# Patient Record
Sex: MALE | Race: WHITE | NOT HISPANIC OR LATINO | Employment: OTHER | URBAN - METROPOLITAN AREA
[De-identification: names, ages, dates, MRNs, and addresses within clinical notes are randomized per-mention and may not be internally consistent; named-entity substitution may affect disease eponyms.]

---

## 2020-02-24 ENCOUNTER — HISTORICAL (OUTPATIENT)
Dept: ADMINISTRATIVE | Facility: HOSPITAL | Age: 65
End: 2020-02-24

## 2020-02-24 LAB
ALBUMIN SERPL-MCNC: 4.6 G/DL (ref 3.8–4.8)
ALBUMIN/GLOB SERPL: 2.4 {RATIO} (ref 1.2–2.2)
ALP SERPL-CCNC: 95 IU/L (ref 39–117)
ALT SERPL-CCNC: 22 IU/L (ref 0–44)
AST SERPL-CCNC: 31 IU/L (ref 0–40)
BASOPHILS # BLD AUTO: 0.1 X10E3/UL (ref 0–0.2)
BASOPHILS NFR BLD AUTO: 1 %
BILIRUB SERPL-MCNC: 0.5 MG/DL (ref 0–1.2)
BUN SERPL-MCNC: 20 MG/DL (ref 8–27)
CALCIUM SERPL-MCNC: 10 MG/DL (ref 8.6–10.2)
CHLORIDE SERPL-SCNC: 100 MMOL/L (ref 96–106)
CHOLEST SERPL-MCNC: 123 MG/DL (ref 100–199)
CHOLEST/HDLC SERPL: 3.3 RATIO (ref 0–5)
CO2 SERPL-SCNC: 25 MMOL/L (ref 20–29)
CREAT SERPL-MCNC: 1.07 MG/DL (ref 0.76–1.27)
CREAT/UREA NIT SERPL: 19 (ref 10–24)
EOSINOPHIL # BLD AUTO: 0.1 X10E3/UL (ref 0–0.4)
EOSINOPHIL NFR BLD AUTO: 2 %
ERYTHROCYTE [DISTWIDTH] IN BLOOD BY AUTOMATED COUNT: 11.9 % (ref 11.6–15.4)
GLOBULIN SER-MCNC: 1.9 G/DL (ref 1.5–4.5)
GLUCOSE SERPL-MCNC: 103 MG/DL (ref 65–99)
HBA1C MFR BLD: 5.7 % (ref 4.8–5.6)
HCT VFR BLD AUTO: 43.2 % (ref 37.5–51)
HDLC SERPL-MCNC: 37 MG/DL
HGB BLD-MCNC: 15 G/DL (ref 13–17.7)
LDLC SERPL CALC-MCNC: 66 MG/DL (ref 0–99)
LYMPHOCYTES # BLD AUTO: 1.7 X10E3/UL (ref 0.7–3.1)
LYMPHOCYTES NFR BLD AUTO: 25 %
MCH RBC QN AUTO: 30.1 PG (ref 26.6–33)
MCHC RBC AUTO-ENTMCNC: 34.7 G/DL (ref 31.5–35.7)
MCV RBC AUTO: 87 FL (ref 79–97)
MONOCYTES # BLD AUTO: 0.5 X10E3/UL (ref 0.1–0.9)
MONOCYTES NFR BLD AUTO: 8 %
NEUTROPHILS # BLD AUTO: 4.2 X10E3/UL (ref 1.4–7)
NEUTROPHILS NFR BLD AUTO: 64 %
PLATELET # BLD AUTO: 238 X10E3/UL (ref 150–450)
POTASSIUM SERPL-SCNC: 4.2 MMOL/L (ref 3.5–5.2)
PROT SERPL-MCNC: 6.5 G/DL (ref 6–8.5)
PSA SERPL-MCNC: 0.6 NG/ML (ref 0–4)
RBC # BLD AUTO: 4.98 X10(6)/MCL (ref 4.14–5.8)
SODIUM SERPL-SCNC: 140 MMOL/L (ref 134–144)
TRIGL SERPL-MCNC: 98 MG/DL (ref 0–149)
TSH SERPL-ACNC: 2.8 MIU/ML (ref 0.45–4.5)
VLDLC SERPL CALC-MCNC: 20 MG/DL (ref 5–40)
WBC # SPEC AUTO: 6.6 X10E3/UL (ref 3.4–10.8)

## 2020-08-25 ENCOUNTER — HISTORICAL (OUTPATIENT)
Dept: ADMINISTRATIVE | Facility: HOSPITAL | Age: 65
End: 2020-08-25

## 2020-08-25 LAB
ALBUMIN SERPL-MCNC: 4.6 G/DL (ref 3.8–4.8)
ALBUMIN/GLOB SERPL: 2.4 {RATIO} (ref 1.2–2.2)
ALP SERPL-CCNC: 91 IU/L (ref 39–117)
ALT SERPL-CCNC: 22 IU/L (ref 0–44)
AST SERPL-CCNC: 31 IU/L (ref 0–40)
BASOPHILS # BLD AUTO: 0.1 X10E3/UL (ref 0–0.2)
BASOPHILS NFR BLD AUTO: 1 %
BILIRUB SERPL-MCNC: 0.5 MG/DL (ref 0–1.2)
BUN SERPL-MCNC: 20 MG/DL (ref 8–27)
CALCIUM SERPL-MCNC: 9.9 MG/DL (ref 8.6–10.2)
CHLORIDE SERPL-SCNC: 101 MMOL/L (ref 96–106)
CHOLEST SERPL-MCNC: 135 MG/DL (ref 100–199)
CHOLEST/HDLC SERPL: 3.8 RATIO (ref 0–5)
CO2 SERPL-SCNC: 26 MMOL/L (ref 20–29)
CREAT SERPL-MCNC: 1.19 MG/DL (ref 0.76–1.27)
CREAT/UREA NIT SERPL: 17 (ref 10–24)
EOSINOPHIL # BLD AUTO: 0.2 X10E3/UL (ref 0–0.4)
EOSINOPHIL NFR BLD AUTO: 3 %
ERYTHROCYTE [DISTWIDTH] IN BLOOD BY AUTOMATED COUNT: 12 % (ref 11.6–15.4)
GLOBULIN SER-MCNC: 1.9 G/DL (ref 1.5–4.5)
GLUCOSE SERPL-MCNC: 111 MG/DL (ref 65–99)
HBA1C MFR BLD: 5.8 % (ref 4.8–5.6)
HCT VFR BLD AUTO: 43.1 % (ref 37.5–51)
HDLC SERPL-MCNC: 36 MG/DL
HGB BLD-MCNC: 14.9 G/DL (ref 13–17.7)
LDLC SERPL CALC-MCNC: 77 MG/DL (ref 0–99)
LYMPHOCYTES # BLD AUTO: 1.9 X10E3/UL (ref 0.7–3.1)
LYMPHOCYTES NFR BLD AUTO: 24 %
MCH RBC QN AUTO: 30.4 PG (ref 26.6–33)
MCHC RBC AUTO-ENTMCNC: 34.6 G/DL (ref 31.5–35.7)
MCV RBC AUTO: 88 FL (ref 79–97)
MONOCYTES # BLD AUTO: 0.6 X10E3/UL (ref 0.1–0.9)
MONOCYTES NFR BLD AUTO: 8 %
NEUTROPHILS # BLD AUTO: 4.8 X10E3/UL (ref 1.4–7)
NEUTROPHILS NFR BLD AUTO: 64 %
PLATELET # BLD AUTO: 224 X10E3/UL (ref 150–450)
POTASSIUM SERPL-SCNC: 4.3 MMOL/L (ref 3.5–5.2)
PROT SERPL-MCNC: 6.5 G/DL (ref 6–8.5)
PSA SERPL-MCNC: 0.6 NG/ML (ref 0–4)
RBC # BLD AUTO: 4.9 X10(6)/MCL (ref 4.14–5.8)
SODIUM SERPL-SCNC: 141 MMOL/L (ref 134–144)
TRIGL SERPL-MCNC: 110 MG/DL (ref 0–149)
TSH SERPL-ACNC: 2.95 MIU/ML (ref 0.45–4.5)
VLDLC SERPL CALC-MCNC: 22 MG/DL (ref 5–40)
WBC # SPEC AUTO: 7.6 X10E3/UL (ref 3.4–10.8)

## 2021-02-23 ENCOUNTER — HISTORICAL (OUTPATIENT)
Dept: ADMINISTRATIVE | Facility: HOSPITAL | Age: 66
End: 2021-02-23

## 2021-02-23 LAB
ALBUMIN SERPL-MCNC: 4.8 G/DL (ref 3.8–4.8)
ALBUMIN/GLOB SERPL: 2.3 {RATIO} (ref 1.2–2.2)
ALP SERPL-CCNC: 106 IU/L (ref 39–117)
ALT SERPL-CCNC: 20 IU/L (ref 0–44)
AST SERPL-CCNC: 27 IU/L (ref 0–40)
BILIRUB SERPL-MCNC: 0.6 MG/DL (ref 0–1.2)
BUN SERPL-MCNC: 18 MG/DL (ref 8–27)
CALCIUM SERPL-MCNC: 9.8 MG/DL (ref 8.6–10.2)
CHLORIDE SERPL-SCNC: 98 MMOL/L (ref 96–106)
CHOLEST SERPL-MCNC: 128 MG/DL (ref 100–199)
CHOLEST/HDLC SERPL: 3.2 RATIO (ref 0–5)
CO2 SERPL-SCNC: 24 MMOL/L (ref 20–29)
CREAT SERPL-MCNC: 1.12 MG/DL (ref 0.76–1.27)
CREAT/UREA NIT SERPL: 16 (ref 10–24)
DEPRECATED CALCIDIOL+CALCIFEROL SERPL-MC: 23.2 NG/ML (ref 30–100)
GLOBULIN SER-MCNC: 2.1 G/DL (ref 1.5–4.5)
GLUCOSE SERPL-MCNC: 110 MG/DL (ref 65–99)
HBA1C MFR BLD: 5.8 % (ref 4.8–5.6)
HDLC SERPL-MCNC: 40 MG/DL
LDLC SERPL CALC-MCNC: 71 MG/DL (ref 0–99)
POTASSIUM SERPL-SCNC: 4.3 MMOL/L (ref 3.5–5.2)
PROT SERPL-MCNC: 6.9 G/DL (ref 6–8.5)
SODIUM SERPL-SCNC: 138 MMOL/L (ref 134–144)
TRIGL SERPL-MCNC: 88 MG/DL (ref 0–149)
VLDLC SERPL CALC-MCNC: 17 MG/DL (ref 5–40)

## 2021-08-30 ENCOUNTER — HISTORICAL (OUTPATIENT)
Dept: ADMINISTRATIVE | Facility: HOSPITAL | Age: 66
End: 2021-08-30

## 2021-08-30 LAB
ALBUMIN SERPL-MCNC: 4.6 G/DL (ref 3.8–4.8)
ALBUMIN/GLOB SERPL: 2.1 {RATIO} (ref 1.2–2.2)
ALP SERPL-CCNC: 106 IU/L (ref 48–121)
ALT SERPL-CCNC: 18 IU/L (ref 0–44)
AST SERPL-CCNC: 25 IU/L (ref 0–40)
BASOPHILS # BLD AUTO: 0.1 X10E3/UL (ref 0–0.2)
BASOPHILS NFR BLD AUTO: 1 %
BILIRUB SERPL-MCNC: 0.4 MG/DL (ref 0–1.2)
BUN SERPL-MCNC: 24 MG/DL (ref 8–27)
CALCIUM SERPL-MCNC: 9.7 MG/DL (ref 8.6–10.2)
CHLORIDE SERPL-SCNC: 103 MMOL/L (ref 96–106)
CHOLEST SERPL-MCNC: 133 MG/DL (ref 100–199)
CHOLEST/HDLC SERPL: 4.2 RATIO (ref 0–5)
CO2 SERPL-SCNC: 26 MMOL/L (ref 20–29)
CREAT SERPL-MCNC: 0.99 MG/DL (ref 0.76–1.27)
CREAT/UREA NIT SERPL: 24 (ref 10–24)
EOSINOPHIL # BLD AUTO: 0.2 X10E3/UL (ref 0–0.4)
EOSINOPHIL NFR BLD AUTO: 2 %
ERYTHROCYTE [DISTWIDTH] IN BLOOD BY AUTOMATED COUNT: 12.8 % (ref 11.6–15.4)
GLOBULIN SER-MCNC: 2.2 G/DL (ref 1.5–4.5)
GLUCOSE SERPL-MCNC: 108 MG/DL (ref 65–99)
HBA1C MFR BLD: 5.8 % (ref 4.8–5.6)
HCT VFR BLD AUTO: 43.9 % (ref 37.5–51)
HDLC SERPL-MCNC: 32 MG/DL
HGB BLD-MCNC: 14.8 G/DL (ref 13–17.7)
LDLC SERPL CALC-MCNC: 80 MG/DL (ref 0–99)
LYMPHOCYTES # BLD AUTO: 2 X10E3/UL (ref 0.7–3.1)
LYMPHOCYTES NFR BLD AUTO: 23 %
MCH RBC QN AUTO: 29.2 PG (ref 26.6–33)
MCHC RBC AUTO-ENTMCNC: 33.7 G/DL (ref 31.5–35.7)
MCV RBC AUTO: 87 FL (ref 79–97)
MICROALBUMIN/CREAT RATIO PNL UR: <6 MG/G CREAT (ref 0–29)
MONOCYTES # BLD AUTO: 0.6 X10E3/UL (ref 0.1–0.9)
MONOCYTES NFR BLD AUTO: 7 %
NEUTROPHILS # BLD AUTO: 5.7 X10E3/UL (ref 1.4–7)
NEUTROPHILS NFR BLD AUTO: 67 %
PLATELET # BLD AUTO: 235 X10E3/UL (ref 150–450)
POTASSIUM SERPL-SCNC: 4.2 MMOL/L (ref 3.5–5.2)
PROT SERPL-MCNC: 6.8 G/DL (ref 6–8.5)
RBC # BLD AUTO: 5.07 X10(6)/MCL (ref 4.14–5.8)
SODIUM SERPL-SCNC: 141 MMOL/L (ref 134–144)
TRIGL SERPL-MCNC: 113 MG/DL (ref 0–149)
TSH SERPL-ACNC: 4.3 MIU/ML (ref 0.45–4.5)
URATE SERPL-MCNC: 5.6 MG/DL (ref 3.8–8.4)
VLDLC SERPL CALC-MCNC: 21 MG/DL (ref 5–40)
WBC # SPEC AUTO: 8.6 X10E3/UL (ref 3.4–10.8)

## 2022-03-07 LAB
AGE: 65
ALBUMIN SERPL-MCNC: 4.5 G/DL (ref 3.4–5)
ALBUMIN/GLOB SERPL: 2 {RATIO}
ALP SERPL-CCNC: 93 U/L (ref 50–144)
ALT SERPL-CCNC: 24 U/L (ref 1–45)
ANION GAP SERPL CALC-SCNC: 8 MMOL/L (ref 2–13)
AST SERPL-CCNC: 44 U/L (ref 17–59)
BASOPHILS # BLD AUTO: 0.07 10*3/UL (ref 0.01–0.08)
BASOPHILS NFR BLD AUTO: 0.9 % (ref 0.1–1.2)
BILIRUB SERPL-MCNC: 0.49 MG/DL (ref 0–1)
BUN SERPL-MCNC: 27 MG/DL (ref 7–20)
CALCIUM SERPL-MCNC: 10 MG/DL (ref 8.4–10.2)
CHLORIDE SERPL-SCNC: 103 MMOL/L (ref 94–110)
CHOLEST SERPL-MCNC: 138 MG/DL (ref 0–200)
CO2 SERPL-SCNC: 28 MMOL/L (ref 21–32)
CREAT SERPL-MCNC: 1.02 MG/DL (ref 0.66–1.25)
CREAT/UREA NIT SERPL: 26.5 (ref 12–20)
DEPRECATED CALCIDIOL+CALCIFEROL SERPL-MC: 55.1 NG/ML (ref 30–100)
EOSINOPHIL # BLD AUTO: 0.12 10*3/UL (ref 0.04–0.54)
EOSINOPHIL NFR BLD AUTO: 1.5 % (ref 0.7–7)
ERYTHROCYTE [DISTWIDTH] IN BLOOD BY AUTOMATED COUNT: 12.4 % (ref 11.6–14.4)
GLOBULIN SER-MCNC: 2.2 G/DL (ref 2–3.9)
GLUCOSE SERPL-MCNC: 100 MG/DL (ref 70–115)
HCT VFR BLD AUTO: 42.5 % (ref 36–52)
HDLC SERPL-MCNC: 34 MG/DL (ref 40–60)
HGB BLD-MCNC: 14.6 G/DL (ref 13–18)
IMM GRANULOCYTES # BLD AUTO: 0.03 10*3/UL (ref 0–0.03)
IMM GRANULOCYTES NFR BLD AUTO: 0.4 % (ref 0–0.5)
LDLC SERPL CALC-MCNC: 78.9 MG/DL (ref 30–100)
LYMPHOCYTES # BLD AUTO: 2.08 10*3/UL (ref 1.32–3.57)
LYMPHOCYTES NFR BLD AUTO: 26.6 % (ref 20–55)
MANUAL DIFF? (OHS): NORMAL
MCH RBC QN AUTO: 29.4 PG (ref 27–34)
MCHC RBC AUTO-ENTMCNC: 34.4 G/DL (ref 31–37)
MCV RBC AUTO: 85.5 FL (ref 79–99)
MONOCYTES # BLD AUTO: 0.63 10*3/UL (ref 0.3–0.82)
MONOCYTES NFR BLD AUTO: 8.1 % (ref 4.7–12.5)
NEUTROPHILS # BLD AUTO: 4.88 10*3/UL (ref 1.78–5.38)
NEUTROPHILS NFR BLD AUTO: 62.5 % (ref 37–73)
PLATELET # BLD AUTO: 225 10*3/UL (ref 140–371)
PMV BLD AUTO: 11.4 FL (ref 9.4–12.4)
POTASSIUM SERPL-SCNC: 4.2 MMOL/L (ref 3.5–5.1)
PROT SERPL-MCNC: 6.7 G/DL (ref 6.3–8.2)
PSA SERPL-MCNC: 0.53 NG/ML (ref 0–4)
RBC # BLD AUTO: 4.97 10*6/UL (ref 4–6)
SODIUM SERPL-SCNC: 139 MMOL/L (ref 135–145)
TRIGL SERPL-MCNC: 118 MG/DL (ref 30–200)
TSH SERPL-ACNC: 3.26 M[IU]/L (ref 0.36–3.74)
WBC # SPEC AUTO: 7.8 10*3/UL (ref 4–11.5)

## 2022-03-28 DIAGNOSIS — Z12.11 SCREENING FOR COLON CANCER: Primary | ICD-10-CM

## 2022-04-11 ENCOUNTER — HISTORICAL (OUTPATIENT)
Dept: ADMINISTRATIVE | Facility: HOSPITAL | Age: 67
End: 2022-04-11
Payer: MEDICARE

## 2022-04-28 VITALS
HEIGHT: 66 IN | BODY MASS INDEX: 34.07 KG/M2 | SYSTOLIC BLOOD PRESSURE: 124 MMHG | WEIGHT: 212 LBS | OXYGEN SATURATION: 97 % | DIASTOLIC BLOOD PRESSURE: 74 MMHG

## 2022-05-15 ENCOUNTER — HISTORICAL (OUTPATIENT)
Dept: ADMINISTRATIVE | Facility: HOSPITAL | Age: 67
End: 2022-05-15
Payer: MEDICARE

## 2022-05-26 ENCOUNTER — OFFICE VISIT (OUTPATIENT)
Dept: GASTROENTEROLOGY | Facility: CLINIC | Age: 67
End: 2022-05-26
Payer: MEDICARE

## 2022-05-26 VITALS
BODY MASS INDEX: 34.55 KG/M2 | DIASTOLIC BLOOD PRESSURE: 80 MMHG | WEIGHT: 215 LBS | HEART RATE: 61 BPM | HEIGHT: 66 IN | SYSTOLIC BLOOD PRESSURE: 151 MMHG | OXYGEN SATURATION: 97 %

## 2022-05-26 DIAGNOSIS — Z12.11 SCREENING FOR COLON CANCER: ICD-10-CM

## 2022-05-26 DIAGNOSIS — Z86.010 HISTORY OF COLON POLYPS: ICD-10-CM

## 2022-05-26 DIAGNOSIS — Z80.0 FAMILY HISTORY OF COLON CANCER: ICD-10-CM

## 2022-05-26 DIAGNOSIS — K59.00 CONSTIPATION, UNSPECIFIED CONSTIPATION TYPE: Primary | ICD-10-CM

## 2022-05-26 PROCEDURE — 99203 PR OFFICE/OUTPT VISIT, NEW, LEVL III, 30-44 MIN: ICD-10-PCS | Mod: S$GLB,,,

## 2022-05-26 PROCEDURE — 99203 OFFICE O/P NEW LOW 30 MIN: CPT | Mod: S$GLB,,,

## 2022-05-26 RX ORDER — PRAVASTATIN SODIUM 40 MG/1
TABLET ORAL
COMMUNITY
Start: 2022-01-20 | End: 2023-04-27

## 2022-05-26 RX ORDER — OLMESARTAN MEDOXOMIL AND HYDROCHLOROTHIAZIDE 40/25 40; 25 MG/1; MG/1
TABLET ORAL
COMMUNITY
Start: 2022-01-20 | End: 2023-07-27

## 2022-05-26 RX ORDER — SOD SULF/POT CHLORIDE/MAG SULF 1.479 G
12 TABLET ORAL DAILY
Qty: 24 TABLET | Refills: 0 | Status: CANCELLED | OUTPATIENT
Start: 2022-05-26

## 2022-05-26 RX ORDER — SODIUM, POTASSIUM,MAG SULFATES 17.5-3.13G
1 SOLUTION, RECONSTITUTED, ORAL ORAL ONCE
Qty: 1 KIT | Refills: 0 | Status: SHIPPED | OUTPATIENT
Start: 2022-05-26 | End: 2022-05-26

## 2022-05-26 NOTE — PROGRESS NOTES
Clinic Note    Reason for visit:  The primary encounter diagnosis was Constipation, unspecified constipation type. Diagnoses of History of colon polyps, Family history of colon cancer, Screening for colon cancer, and BMI 34.0-34.9,adult were also pertinent to this visit.    PCP: Sil Gracia / [place] [address]    HPI:  This is a 66 y.o. male who is here to establish care. Patient's last colonoscopy was with Dr. Laurent at least 10 years ago and reports having polyps taken out in the past. Patient denies any diarrhea, blood in stool, reflux, or abdominal pain. Mother had colon cancer. Patient reports occasional constipation, but controls this with diet. Patient states he doesn't go longer than 2 days without a BM.         Review of Systems   Constitutional: Negative for chills, diaphoresis, fatigue, fever and unexpected weight change.   HENT: Negative for hearing loss, mouth sores, nosebleeds, postnasal drip, sore throat, trouble swallowing and voice change.    Eyes: Negative for pain, discharge and eye dryness.   Respiratory: Negative for apnea, cough, choking, chest tightness, shortness of breath and wheezing.    Cardiovascular: Negative for chest pain, palpitations, leg swelling and claudication.   Gastrointestinal: Negative for abdominal distention, abdominal pain, anal bleeding, blood in stool, change in bowel habit, constipation, diarrhea, nausea, rectal pain, vomiting, reflux, fecal incontinence and change in bowel habit.   Genitourinary: Negative for bladder incontinence, difficulty urinating, dysuria, flank pain, frequency and hematuria.   Musculoskeletal: Negative for arthralgias, back pain, joint swelling and joint deformity.   Integumentary:  Negative for color change, rash and wound.   Allergic/Immunologic: Negative for environmental allergies and food allergies.   Neurological: Negative for seizures, facial asymmetry, speech difficulty, weakness, headaches and memory loss.  "  Hematological: Negative for adenopathy. Does not bruise/bleed easily.   Psychiatric/Behavioral: Negative for agitation, behavioral problems, confusion, hallucinations and sleep disturbance.      Past Medical History:   Diagnosis Date    High blood pressure     High cholesterol      History reviewed. No pertinent surgical history.  Family History   Problem Relation Age of Onset    Colon cancer Mother      Social History     Tobacco Use    Smoking status: Never Smoker    Smokeless tobacco: Never Used   Substance Use Topics    Alcohol use: Never    Drug use: Never     Review of patient's allergies indicates:  No Known Allergies     Medication List with Changes/Refills   New Medications    SODIUM,POTASSIUM,MAG SULFATES (SUPREP BOWEL PREP KIT) 17.5-3.13-1.6 GRAM SOLR    Take 177 mLs by mouth once. Take according to kit instructions but DO NOT eat breakfast the morning of procedure. for 1 dose   Current Medications    OLMESARTAN-HYDROCHLOROTHIAZIDE (BENICAR HCT) 40-25 MG PER TABLET    Take by mouth.    PRAVASTATIN (PRAVACHOL) 40 MG TABLET      See Instructions, TAKE ONE TABLET BY MOUTH DAILY, # 90 tab(s), 1 Refill(s), Pharmacy: [facility], 167.64, cm, Height/Length Dosing, 08/30/21 7:07:00 CDT, 96.16, kg, Weight Dosing, 08/30/21 7:07:00 CDT       Vital Signs:  BP (!) 151/80 (BP Location: Left arm, Patient Position: Sitting, BP Method: Medium (Automatic))   Pulse 61   Ht 5' 6" (1.676 m)   Wt 97.5 kg (215 lb)   SpO2 97%   BMI 34.70 kg/m²   Body mass index is 34.7 kg/m².      Physical Exam  Constitutional:       General: He is not in acute distress.     Appearance: Normal appearance. He is well-developed. He is obese. He is not ill-appearing or toxic-appearing.   HENT:      Head: Normocephalic and atraumatic.      Nose: Nose normal.      Mouth/Throat:      Mouth: Mucous membranes are moist.      Pharynx: Oropharynx is clear. No oropharyngeal exudate or posterior oropharyngeal erythema. "   Eyes:      General: Lids are normal. No scleral icterus.        Right eye: No discharge.         Left eye: No discharge.      Extraocular Movements: Extraocular movements intact.      Conjunctiva/sclera: Conjunctivae normal.   Cardiovascular:      Rate and Rhythm: Normal rate and regular rhythm.      Pulses:           Radial pulses are 2+ on the right side and 2+ on the left side.   Pulmonary:      Effort: Pulmonary effort is normal. No respiratory distress.      Breath sounds: No stridor. No wheezing or rhonchi.   Abdominal:      General: Bowel sounds are normal. There is no distension.      Palpations: Abdomen is soft. There is no fluid wave, hepatomegaly, splenomegaly or mass.      Tenderness: There is no abdominal tenderness. There is no guarding or rebound.   Musculoskeletal:      Cervical back: Full passive range of motion without pain.      Right lower leg: No edema.      Left lower leg: No edema.   Lymphadenopathy:      Cervical: No cervical adenopathy.   Skin:     General: Skin is warm and dry.      Capillary Refill: Capillary refill takes less than 2 seconds.      Coloration: Skin is not cyanotic, jaundiced or pale.      Findings: No rash.   Neurological:      General: No focal deficit present.      Mental Status: He is alert and oriented to person, place, and time.   Psychiatric:         Mood and Affect: Mood normal.         Behavior: Behavior is cooperative.            All of the data above and below has been reviewed by myself and any further interpretations will be reflected in the assessment and plan.   The data includes review of external notes, and independent interpretation of lab results, procedures, x-rays, and imaging reports.      Assessment:  Constipation, unspecified constipation type    History of colon polyps  -     Ambulatory Referral to External Surgery    Family history of colon cancer  -     Ambulatory Referral to External Surgery    Screening for colon cancer  -     Ambulatory  Referral to External Surgery  -     Ambulatory referral/consult to Gastroenterology    BMI 34.0-34.9,adult    Other orders  -     sodium,potassium,mag sulfates (SUPREP BOWEL PREP KIT) 17.5-3.13-1.6 gram SolR; Take 177 mLs by mouth once. Take according to kit instructions but DO NOT eat breakfast the morning of procedure. for 1 dose  Dispense: 1 kit; Refill: 0      Constipation controlled with diet.    Recommendations:  Schedule colonoscopy with Dr. Guevara.     Patient screened for and received weight management and nutritional counseling regarding BMI of less than 18 or greater than 25.  Risks, benefits, and alternatives of medical management, any associated procedures, and/or treatment discussed with the patient. Patient given opportunity to ask questions and voices understanding. Patient has elected to proceed with the recommended care modalities as discussed.    Follow up if symptoms worsen or fail to improve.    Order summary:  Orders Placed This Encounter   Procedures    Ambulatory Referral to External Surgery           Nadeen Scales NP    This document may have been created using a voice recognition transcribing system. Incorrect words or phrases may have been missed during proofreading. Please interpret accordingly or contact me for clarification.

## 2022-07-13 ENCOUNTER — TELEPHONE (OUTPATIENT)
Dept: GASTROENTEROLOGY | Facility: CLINIC | Age: 67
End: 2022-07-13
Payer: MEDICARE

## 2022-07-13 NOTE — TELEPHONE ENCOUNTER
Script sent to [facility] pharmacy at office visit. Left voicemail for patient instructing to check with pharmacy and call back if any issues.   ----- Message from Neeta Eden sent at 7/13/2022  8:56 AM CDT -----  Durga Plata is calling to request his colon prep to be called out to his pharmacy. He would like the SUTAB and wants it sent in today. He will be picking it up from his pharmacy this afternoon. Please give him a call back for any questions [phone]      [facility] [place] [facility] [address]  [address] [address]  Phone: [phone] Fax: [phone]

## 2022-07-14 RX ORDER — SOD SULF/POT CHLORIDE/MAG SULF 1.479 G
12 TABLET ORAL DAILY
Qty: 24 TABLET | Refills: 0 | Status: SHIPPED | OUTPATIENT
Start: 2022-07-14 | End: 2023-03-06 | Stop reason: ALTCHOICE

## 2022-07-14 NOTE — TELEPHONE ENCOUNTER
Patient would like rx re-sent to pharmacy.   ----- Message from Naomi Medina sent at 7/14/2022  9:13 AM CDT -----  Patient has procedure scheduled 7/18 need to speak with nurse calling his prep medication into the pharmacy. Call back number [phone]. Triny

## 2022-07-18 ENCOUNTER — OUTSIDE PLACE OF SERVICE (OUTPATIENT)
Dept: GASTROENTEROLOGY | Facility: CLINIC | Age: 67
End: 2022-07-18
Payer: MEDICARE

## 2022-07-18 LAB — CRC RECOMMENDATION EXT: NORMAL

## 2022-07-18 PROCEDURE — G0105 COLORECTAL SCRN; HI RISK IND: ICD-10-PCS | Mod: ,,, | Performed by: INTERNAL MEDICINE

## 2022-07-18 PROCEDURE — G0105 COLORECTAL SCRN; HI RISK IND: HCPCS | Mod: ,,, | Performed by: INTERNAL MEDICINE

## 2022-11-15 ENCOUNTER — DOCUMENTATION ONLY (OUTPATIENT)
Dept: GASTROENTEROLOGY | Facility: CLINIC | Age: 67
End: 2022-11-15
Payer: MEDICARE

## 2023-03-06 ENCOUNTER — OFFICE VISIT (OUTPATIENT)
Dept: FAMILY MEDICINE | Facility: CLINIC | Age: 68
End: 2023-03-06
Payer: MEDICARE

## 2023-03-06 VITALS
RESPIRATION RATE: 18 BRPM | HEART RATE: 59 BPM | OXYGEN SATURATION: 96 % | HEIGHT: 66 IN | WEIGHT: 216 LBS | SYSTOLIC BLOOD PRESSURE: 128 MMHG | TEMPERATURE: 98 F | DIASTOLIC BLOOD PRESSURE: 64 MMHG | BODY MASS INDEX: 34.72 KG/M2

## 2023-03-06 DIAGNOSIS — Z79.899 LONG TERM USE OF DRUG: Primary | ICD-10-CM

## 2023-03-06 DIAGNOSIS — E78.5 HYPERLIPIDEMIA, UNSPECIFIED HYPERLIPIDEMIA TYPE: ICD-10-CM

## 2023-03-06 DIAGNOSIS — E55.9 VITAMIN D DEFICIENCY: ICD-10-CM

## 2023-03-06 DIAGNOSIS — Z12.11 COLON CANCER SCREENING: ICD-10-CM

## 2023-03-06 DIAGNOSIS — E79.0 HYPERURICEMIA: ICD-10-CM

## 2023-03-06 DIAGNOSIS — Z00.00 MEDICARE ANNUAL WELLNESS VISIT, SUBSEQUENT: ICD-10-CM

## 2023-03-06 PROBLEM — R73.01 IMPAIRED FASTING GLUCOSE: Status: ACTIVE | Noted: 2023-03-06

## 2023-03-06 PROBLEM — N40.0 BENIGN PROSTATIC HYPERPLASIA WITHOUT URINARY OBSTRUCTION: Status: ACTIVE | Noted: 2023-03-06

## 2023-03-06 PROBLEM — Z86.39 H/O VITAMIN D DEFICIENCY: Status: ACTIVE | Noted: 2023-03-06

## 2023-03-06 PROBLEM — I10 HYPERTENSION: Status: ACTIVE | Noted: 2023-03-06

## 2023-03-06 LAB
ALBUMIN SERPL-MCNC: 4.3 G/DL (ref 3.4–5)
ALBUMIN/GLOB SERPL: 1.7 RATIO
ALP SERPL-CCNC: 97 UNIT/L (ref 50–144)
ALT SERPL-CCNC: 27 UNIT/L (ref 1–45)
ANION GAP SERPL CALC-SCNC: 7 MEQ/L (ref 2–13)
AST SERPL-CCNC: 40 UNIT/L (ref 17–59)
BASOPHILS # BLD AUTO: 0.08 X10(3)/MCL (ref 0.01–0.08)
BASOPHILS NFR BLD AUTO: 1 % (ref 0.1–1.2)
BILIRUBIN DIRECT+TOT PNL SERPL-MCNC: 0.5 MG/DL (ref 0–1)
BUN SERPL-MCNC: 22 MG/DL (ref 7–20)
CALCIUM SERPL-MCNC: 9.8 MG/DL (ref 8.4–10.2)
CHLORIDE SERPL-SCNC: 102 MMOL/L (ref 98–110)
CHOLEST SERPL-MCNC: 120 MG/DL (ref 0–200)
CO2 SERPL-SCNC: 30 MMOL/L (ref 21–32)
CREAT SERPL-MCNC: 0.89 MG/DL (ref 0.66–1.25)
CREAT/UREA NIT SERPL: 25 (ref 12–20)
CTP QC/QA: YES
DEPRECATED CALCIDIOL+CALCIFEROL SERPL-MC: 51.9 NG/ML (ref 30–100)
EOSINOPHIL # BLD AUTO: 0.22 X10(3)/MCL (ref 0.04–0.54)
EOSINOPHIL NFR BLD AUTO: 2.9 % (ref 0.7–7)
ERYTHROCYTE [DISTWIDTH] IN BLOOD BY AUTOMATED COUNT: 12.4 % (ref 11.6–14.4)
EST. AVERAGE GLUCOSE BLD GHB EST-MCNC: 114 MG/DL (ref 70–115)
FECAL OCCULT BLOOD, POC: NEGATIVE
GFR SERPLBLD CREATININE-BSD FMLA CKD-EPI: >90 MLS/MIN/1.73/M2
GLOBULIN SER-MCNC: 2.5 GM/DL (ref 2–3.9)
GLUCOSE SERPL-MCNC: 115 MG/DL (ref 70–115)
HBA1C MFR BLD: 5.6 % (ref 4–6)
HCT VFR BLD AUTO: 43.2 % (ref 36–52)
HDLC SERPL-MCNC: 32 MG/DL (ref 40–60)
HGB BLD-MCNC: 14.8 G/DL (ref 13–18)
IMM GRANULOCYTES # BLD AUTO: 0.02 X10(3)/MCL (ref 0–0.03)
IMM GRANULOCYTES NFR BLD AUTO: 0.3 % (ref 0–0.5)
LDLC SERPL DIRECT ASSAY-SCNC: 71.8 MG/DL (ref 30–100)
LYMPHOCYTES # BLD AUTO: 2 X10(3)/MCL (ref 1.32–3.57)
LYMPHOCYTES NFR BLD AUTO: 26.1 % (ref 20–55)
MCH RBC QN AUTO: 29.5 PG (ref 27–34)
MCV RBC AUTO: 86.2 FL (ref 79–99)
MEAN CELL HEMOGLOBIN CONCENTRATION (OHS) G/DL: 34.3 G/DL (ref 31–37)
MONOCYTES # BLD AUTO: 0.59 X10(3)/MCL (ref 0.3–0.82)
MONOCYTES NFR BLD AUTO: 7.7 % (ref 4.7–12.5)
NEUTROPHILS # BLD AUTO: 4.76 X10(3)/MCL (ref 1.78–5.38)
NEUTROPHILS NFR BLD AUTO: 62 % (ref 37–73)
NRBC BLD AUTO-RTO: 0 % (ref 0–1)
PLATELET # BLD AUTO: 217 X10(3)/MCL (ref 140–371)
PMV BLD AUTO: 11.2 FL (ref 9.4–12.4)
POTASSIUM SERPL-SCNC: 4 MMOL/L (ref 3.5–5.1)
PROT SERPL-MCNC: 6.8 GM/DL (ref 6.3–8.2)
RBC # BLD AUTO: 5.01 X10(6)/MCL (ref 4–6)
SODIUM SERPL-SCNC: 139 MMOL/L (ref 135–145)
TRIGL SERPL-MCNC: 102 MG/DL (ref 30–200)
TSH SERPL-ACNC: 2.54 UIU/ML (ref 0.36–3.74)
URATE SERPL-MCNC: 6.4 MG/DL (ref 2.6–7.2)
WBC # SPEC AUTO: 7.7 X10(3)/MCL (ref 4–11.5)

## 2023-03-06 PROCEDURE — 99397 PER PM REEVAL EST PAT 65+ YR: CPT | Mod: GZ,,, | Performed by: NURSE PRACTITIONER

## 2023-03-06 PROCEDURE — 82270 OCCULT BLOOD FECES: CPT | Mod: RHCUB | Performed by: NURSE PRACTITIONER

## 2023-03-06 PROCEDURE — 99397 PR PREVENTIVE VISIT,EST,65 & OVER: ICD-10-PCS | Mod: GZ,,, | Performed by: NURSE PRACTITIONER

## 2023-03-06 RX ORDER — UBIDECARENONE 30 MG
100 CAPSULE ORAL DAILY
COMMUNITY

## 2023-03-06 RX ORDER — CHOLECALCIFEROL (VITAMIN D3) 25 MCG
1000 TABLET ORAL DAILY
COMMUNITY

## 2023-03-06 RX ORDER — MULTIVITAMIN
1 TABLET ORAL DAILY
COMMUNITY

## 2023-03-06 RX ORDER — CALCIUM CARB, CITRATE, MALATE 250 MG
1 CAPSULE ORAL DAILY
COMMUNITY

## 2023-03-06 RX ORDER — NAPROXEN SODIUM 220 MG
220 TABLET ORAL NIGHTLY
COMMUNITY

## 2023-03-06 NOTE — PROGRESS NOTES
"Subjective:       Patient ID: Durga Plata is a 67 y.o. male.    Chief Complaint: Medicare AWV (Last colonoscopy 7/18/22) and Bloodwork    Medicare wellness, due for PSA screening after 5/17/23, colonoscopy 3/22 and due for recheck colonscopy 2028 unless problems. Blood pressure controlled with medication. Active and feeling well. No problems with resting unless dog awakens.     Review of Systems   Constitutional:  Negative for activity change, chills, fatigue, fever and unexpected weight change.   HENT:  Negative for dental problem, hearing loss and nosebleeds.    Eyes:  Negative for discharge, redness and visual disturbance.   Respiratory:  Negative for cough, chest tightness and wheezing.    Cardiovascular:  Positive for leg swelling. Negative for chest pain and palpitations.   Gastrointestinal:  Negative for abdominal distention, blood in stool, change in bowel habit, nausea, vomiting and change in bowel habit.   Endocrine: Negative for cold intolerance, heat intolerance, polydipsia and polyuria.   Genitourinary:  Negative for difficulty urinating, dysuria, frequency, hematuria and urgency.   Musculoskeletal:  Negative for arthralgias, gait problem and joint deformity.   Allergic/Immunologic: Negative for environmental allergies and food allergies.   Neurological:  Negative for vertigo, tremors, syncope, weakness, light-headedness, numbness, headaches, coordination difficulties, memory loss and coordination difficulties.   Hematological:  Negative for adenopathy. Does not bruise/bleed easily.   Psychiatric/Behavioral:  Negative for confusion, self-injury, sleep disturbance and suicidal ideas.        Objective:      Vitals:    03/06/23 0656   BP: 128/64   Pulse: (!) 59   Resp: 18   Temp: 97.5 °F (36.4 °C)   TempSrc: Temporal   SpO2: 96%   Weight: 98 kg (216 lb)   Height: 5' 6" (1.676 m)       Physical Exam  Vitals and nursing note reviewed.   Constitutional:       General: He is not in acute distress.     " Appearance: Normal appearance. He is obese.   HENT:      Head: Normocephalic.      Right Ear: Tympanic membrane normal.      Left Ear: Tympanic membrane normal.      Nose: Nose normal.      Mouth/Throat:      Mouth: Mucous membranes are moist.      Pharynx: Oropharynx is clear.   Eyes:      General: Lids are normal.      Extraocular Movements: Extraocular movements intact.      Conjunctiva/sclera: Conjunctivae normal.   Neck:      Thyroid: No thyroid mass, thyromegaly or thyroid tenderness.      Vascular: No carotid bruit.      Trachea: Trachea normal.   Cardiovascular:      Rate and Rhythm: Normal rate and regular rhythm.      Pulses: Normal pulses.      Heart sounds: Normal heart sounds.   Pulmonary:      Effort: Pulmonary effort is normal.      Breath sounds: Normal breath sounds.   Chest:   Breasts:     Breasts are symmetrical.   Abdominal:      General: Abdomen is flat. Bowel sounds are normal.      Tenderness: There is no abdominal tenderness.      Hernia: No hernia is present.   Genitourinary:     Penis: Normal.       Testes: Normal.      Prostate: Enlarged. Not tender and no nodules present.      Rectum: Guaiac result negative. No mass, external hemorrhoid or internal hemorrhoid.   Musculoskeletal:         General: Normal range of motion.      Cervical back: Normal range of motion.      Right lower leg: No edema.      Left lower leg: No edema.   Lymphadenopathy:      Cervical: No cervical adenopathy.   Skin:     General: Skin is warm and dry.   Neurological:      Mental Status: He is alert and oriented to person, place, and time. Mental status is at baseline.   Psychiatric:         Attention and Perception: Attention normal.         Mood and Affect: Mood normal.         Speech: Speech normal.         Behavior: Behavior normal. Behavior is cooperative.       Assessment/Plan:     1. Medicare annual wellness visit, subsequent  Assessment & Plan:  Blood pressure goal <130/80, colon cancer screening 3/21, please  locate past Hepatitis C screening, shingles and pneumoccal vaccine documentation in [facility] or [facility]. PSA screening not due until 5/17/23, schedule with next lab.          Follow up in about 6 months (around 9/6/2023).          Discussed the diagnosis, prognosis, plan of care, chronic nature of and indications for, risks and benefits of treatment for conditions.  Continue all medications as prescribed unless otherwise noted.   Call if patient develops other symptoms or if not better in 2-3 days and sooner if worse. Emphasized the importance of compliance with all recommendations, including medication use and timely follow up. Instructed to return as noted be or sooner if patient develops any other problems or if there are any other additional questions or concerns.

## 2023-03-06 NOTE — PATIENT INSTRUCTIONS
The patient and wife talk about advance directive decisions, but doesn't have living will. Will notify with lab when available.

## 2023-03-06 NOTE — ASSESSMENT & PLAN NOTE
Blood pressure goal <130/80, colon cancer screening 3/21, please locate past Hepatitis C screening, shingles and pneumoccal vaccine documentation in [facility] or [facility]. PSA screening not due until 5/17/23, schedule with next lab.

## 2023-03-07 ENCOUNTER — TELEPHONE (OUTPATIENT)
Dept: FAMILY MEDICINE | Facility: CLINIC | Age: 68
End: 2023-03-07
Payer: MEDICARE

## 2023-03-07 NOTE — TELEPHONE ENCOUNTER
Spouse was notified. Sil is wanting to recheck  CMP, FLP, Hgb a1c, PSA,  in 6 months unless problems

## 2023-03-07 NOTE — TELEPHONE ENCOUNTER
----- Message from Sil Joaquin DNP sent at 3/6/2023  6:04 PM CST -----  Normal lab with well controlled cholesterol, normal liver, kidney,, in prediabetes range but improved from last check,  no anemia, vitamin D sufficient at 51, continue supplementation. Thyroid and uric acid good.  Continue current lifestyle and refill pravachol 40, vitamin D 1000, benicar HCT 40/25 and multivitamin and recheck  CMP, FLP, Hgb a1c, PSA,  in 6 months unless problems.

## 2023-04-27 DIAGNOSIS — E78.5 HYPERLIPIDEMIA, UNSPECIFIED HYPERLIPIDEMIA TYPE: Primary | ICD-10-CM

## 2023-04-27 RX ORDER — PRAVASTATIN SODIUM 40 MG/1
TABLET ORAL
Qty: 90 TABLET | Refills: 1 | Status: SHIPPED | OUTPATIENT
Start: 2023-04-27 | End: 2023-10-19

## 2023-06-05 PROBLEM — Z00.00 MEDICARE ANNUAL WELLNESS VISIT, SUBSEQUENT: Status: RESOLVED | Noted: 2023-03-06 | Resolved: 2023-06-05

## 2023-07-27 DIAGNOSIS — I10 HYPERTENSION, UNSPECIFIED TYPE: Primary | ICD-10-CM

## 2023-07-27 RX ORDER — OLMESARTAN MEDOXOMIL AND HYDROCHLOROTHIAZIDE 40/25 40; 25 MG/1; MG/1
TABLET ORAL
Qty: 90 TABLET | Refills: 1 | Status: SHIPPED | OUTPATIENT
Start: 2023-07-27 | End: 2024-01-11

## 2023-09-05 ENCOUNTER — OFFICE VISIT (OUTPATIENT)
Dept: FAMILY MEDICINE | Facility: CLINIC | Age: 68
End: 2023-09-05
Payer: MEDICARE

## 2023-09-05 VITALS
HEART RATE: 62 BPM | OXYGEN SATURATION: 97 % | SYSTOLIC BLOOD PRESSURE: 132 MMHG | TEMPERATURE: 98 F | RESPIRATION RATE: 20 BRPM | BODY MASS INDEX: 35.52 KG/M2 | WEIGHT: 221 LBS | HEIGHT: 66 IN | DIASTOLIC BLOOD PRESSURE: 70 MMHG

## 2023-09-05 DIAGNOSIS — Z13.6 SCREENING FOR AAA (ABDOMINAL AORTIC ANEURYSM): ICD-10-CM

## 2023-09-05 DIAGNOSIS — Z79.899 LONG TERM CURRENT USE OF THERAPEUTIC DRUG: ICD-10-CM

## 2023-09-05 DIAGNOSIS — Z11.59 NEED FOR HEPATITIS C SCREENING TEST: ICD-10-CM

## 2023-09-05 DIAGNOSIS — R73.01 IMPAIRED FASTING GLUCOSE: ICD-10-CM

## 2023-09-05 DIAGNOSIS — Z12.11 SCREENING FOR COLON CANCER: ICD-10-CM

## 2023-09-05 DIAGNOSIS — E55.9 VITAMIN D DEFICIENCY: ICD-10-CM

## 2023-09-05 DIAGNOSIS — E78.5 HYPERLIPIDEMIA WITH TARGET LDL LESS THAN 100: Primary | ICD-10-CM

## 2023-09-05 DIAGNOSIS — I10 PRIMARY HYPERTENSION: ICD-10-CM

## 2023-09-05 DIAGNOSIS — Z23 NEED FOR PROPHYLACTIC VACCINATION AGAINST STREPTOCOCCUS PNEUMONIAE (PNEUMOCOCCUS): ICD-10-CM

## 2023-09-05 DIAGNOSIS — Z86.39 H/O VITAMIN D DEFICIENCY: ICD-10-CM

## 2023-09-05 DIAGNOSIS — Z12.5 SCREENING FOR MALIGNANT NEOPLASM OF PROSTATE: ICD-10-CM

## 2023-09-05 DIAGNOSIS — Z23 NEED FOR PNEUMOCOCCAL VACCINATION: ICD-10-CM

## 2023-09-05 LAB
ALBUMIN SERPL-MCNC: 4.5 G/DL (ref 3.4–5)
ALBUMIN/GLOB SERPL: 2 RATIO
ALP SERPL-CCNC: 83 UNIT/L (ref 50–144)
ALT SERPL-CCNC: 30 UNIT/L (ref 1–45)
ANION GAP SERPL CALC-SCNC: 8 MEQ/L (ref 2–13)
AST SERPL-CCNC: 41 UNIT/L (ref 17–59)
BASOPHILS # BLD AUTO: 0.08 X10(3)/MCL (ref 0.01–0.08)
BASOPHILS NFR BLD AUTO: 1.1 % (ref 0.1–1.2)
BILIRUB SERPL-MCNC: 0.9 MG/DL (ref 0–1)
BUN SERPL-MCNC: 22 MG/DL (ref 7–20)
CALCIUM SERPL-MCNC: 9.7 MG/DL (ref 8.4–10.2)
CHLORIDE SERPL-SCNC: 100 MMOL/L (ref 98–110)
CHOLEST SERPL-MCNC: 139 MG/DL (ref 0–200)
CO2 SERPL-SCNC: 30 MMOL/L (ref 21–32)
CREAT SERPL-MCNC: 0.94 MG/DL (ref 0.66–1.25)
CREAT/UREA NIT SERPL: 23 (ref 12–20)
DEPRECATED CALCIDIOL+CALCIFEROL SERPL-MC: 76.9 NG/ML (ref 30–100)
EOSINOPHIL # BLD AUTO: 0.15 X10(3)/MCL (ref 0.04–0.54)
EOSINOPHIL NFR BLD AUTO: 2 % (ref 0.7–7)
ERYTHROCYTE [DISTWIDTH] IN BLOOD BY AUTOMATED COUNT: 12.3 %
EST. AVERAGE GLUCOSE BLD GHB EST-MCNC: 119.8 MG/DL (ref 70–115)
GFR SERPLBLD CREATININE-BSD FMLA CKD-EPI: 89 MLS/MIN/1.73/M2
GLOBULIN SER-MCNC: 2.3 GM/DL (ref 2–3.9)
GLUCOSE SERPL-MCNC: 110 MG/DL (ref 70–115)
HBA1C MFR BLD: 5.8 % (ref 4–6)
HCT VFR BLD AUTO: 43.8 % (ref 36–52)
HDLC SERPL-MCNC: 34 MG/DL (ref 40–60)
HGB BLD-MCNC: 15 G/DL (ref 13–18)
IMM GRANULOCYTES # BLD AUTO: 0.02 X10(3)/MCL (ref 0–0.03)
IMM GRANULOCYTES NFR BLD AUTO: 0.3 % (ref 0–0.5)
LDLC SERPL DIRECT ASSAY-SCNC: 83 MG/DL (ref 30–100)
LYMPHOCYTES # BLD AUTO: 2.02 X10(3)/MCL (ref 1.32–3.57)
LYMPHOCYTES NFR BLD AUTO: 26.7 % (ref 20–55)
MCH RBC QN AUTO: 29.1 PG (ref 27–34)
MCHC RBC AUTO-ENTMCNC: 34.2 G/DL (ref 31–37)
MCV RBC AUTO: 84.9 FL (ref 79–99)
MONOCYTES # BLD AUTO: 0.6 X10(3)/MCL (ref 0.3–0.82)
MONOCYTES NFR BLD AUTO: 7.9 % (ref 4.7–12.5)
NEUTROPHILS # BLD AUTO: 4.7 X10(3)/MCL (ref 1.78–5.38)
NEUTROPHILS NFR BLD AUTO: 62 % (ref 37–73)
NRBC BLD AUTO-RTO: 0 %
PLATELET # BLD AUTO: 228 X10(3)/MCL (ref 140–371)
PMV BLD AUTO: 11.3 FL (ref 9.4–12.4)
POTASSIUM SERPL-SCNC: 4 MMOL/L (ref 3.5–5.1)
PROT SERPL-MCNC: 6.8 GM/DL (ref 6.3–8.2)
PSA SERPL-MCNC: 0.86 NG/ML
RBC # BLD AUTO: 5.16 X10(6)/MCL (ref 4–6)
SODIUM SERPL-SCNC: 138 MMOL/L (ref 135–145)
TRIGL SERPL-MCNC: 143 MG/DL (ref 30–200)
WBC # SPEC AUTO: 7.57 X10(3)/MCL (ref 4–11.5)

## 2023-09-05 PROCEDURE — 80061 LIPID PANEL: CPT | Performed by: NURSE PRACTITIONER

## 2023-09-05 PROCEDURE — 84153 ASSAY OF PSA TOTAL: CPT | Performed by: NURSE PRACTITIONER

## 2023-09-05 PROCEDURE — 86803 HEPATITIS C AB TEST: CPT | Performed by: NURSE PRACTITIONER

## 2023-09-05 PROCEDURE — 80053 COMPREHEN METABOLIC PANEL: CPT | Performed by: NURSE PRACTITIONER

## 2023-09-05 PROCEDURE — 83036 HEMOGLOBIN GLYCOSYLATED A1C: CPT | Performed by: NURSE PRACTITIONER

## 2023-09-05 PROCEDURE — 85025 COMPLETE CBC W/AUTO DIFF WBC: CPT | Performed by: NURSE PRACTITIONER

## 2023-09-05 PROCEDURE — 90677 PCV20 VACCINE IM: CPT | Mod: ,,, | Performed by: NURSE PRACTITIONER

## 2023-09-05 PROCEDURE — 99214 OFFICE O/P EST MOD 30 MIN: CPT | Mod: ,,, | Performed by: NURSE PRACTITIONER

## 2023-09-05 PROCEDURE — G0009 ADMIN PNEUMOCOCCAL VACCINE: HCPCS | Mod: ,,, | Performed by: NURSE PRACTITIONER

## 2023-09-05 PROCEDURE — 82306 VITAMIN D 25 HYDROXY: CPT | Performed by: NURSE PRACTITIONER

## 2023-09-05 PROCEDURE — 99214 PR OFFICE/OUTPT VISIT, EST, LEVL IV, 30-39 MIN: ICD-10-PCS | Mod: ,,, | Performed by: NURSE PRACTITIONER

## 2023-09-05 PROCEDURE — 90677 PNEUMOCOCCAL CONJUGATE VACCINE 20-VALENT: ICD-10-PCS | Mod: ,,, | Performed by: NURSE PRACTITIONER

## 2023-09-05 PROCEDURE — G0009 PNEUMOCOCCAL CONJUGATE VACCINE 20-VALENT: ICD-10-PCS | Mod: ,,, | Performed by: NURSE PRACTITIONER

## 2023-09-05 NOTE — ASSESSMENT & PLAN NOTE
Had only smoked for a very short time when very young no smoking other than this but has never been screened to ensure that there is no AAA.  We will schedule at a time that is convenient for him.

## 2023-09-05 NOTE — PROGRESS NOTES
"Subjective:       Patient ID: Durga Plata is a 67 y.o. male.    Chief Complaint: Follow-up (6 mth f/u with fasting labs. )    Patient presents for fasting labs and medication chronic disease states.      Review of Systems   Constitutional:  Negative for activity change and appetite change.   HENT:  Negative for nasal congestion, trouble swallowing and voice change.    Eyes: Negative.  Negative for visual disturbance.   Respiratory: Negative.  Negative for chest tightness, shortness of breath and wheezing.    Cardiovascular:  Negative for chest pain, palpitations and leg swelling.   Endocrine: Negative for cold intolerance, heat intolerance and polyuria.   Genitourinary:  Negative for bladder incontinence, difficulty urinating, flank pain and frequency.   Allergic/Immunologic: Negative for environmental allergies and food allergies.   Neurological:  Negative for vertigo, tremors, seizures, syncope, light-headedness, headaches, coordination difficulties and coordination difficulties.   Hematological:  Negative for adenopathy. Does not bruise/bleed easily.   Psychiatric/Behavioral:  Negative for agitation, sleep disturbance and suicidal ideas.          Objective:      Vitals:    09/05/23 0700   BP: 132/70   Pulse: 62   Resp: 20   Temp: 97.5 °F (36.4 °C)   SpO2: 97%   Weight: 100.2 kg (221 lb)   Height: 5' 6" (1.676 m)       Physical Exam  Vitals and nursing note reviewed.   Constitutional:       General: He is not in acute distress.     Appearance: He is not ill-appearing.   HENT:      Head: Normocephalic and atraumatic.      Mouth/Throat:      Mouth: Mucous membranes are moist.      Pharynx: Oropharynx is clear.   Eyes:      General: No scleral icterus.     Extraocular Movements: Extraocular movements intact.      Conjunctiva/sclera: Conjunctivae normal.      Pupils: Pupils are equal, round, and reactive to light.   Neck:      Vascular: No carotid bruit.   Cardiovascular:      Rate and Rhythm: Normal rate and " regular rhythm.      Heart sounds: No murmur heard.     No friction rub. No gallop.   Pulmonary:      Effort: Pulmonary effort is normal. No respiratory distress.      Breath sounds: Normal breath sounds. No wheezing, rhonchi or rales.   Abdominal:      General: Abdomen is flat. Bowel sounds are normal. There is no distension.      Palpations: Abdomen is soft. There is no mass.      Tenderness: There is no abdominal tenderness.   Musculoskeletal:         General: Normal range of motion.      Cervical back: Normal range of motion and neck supple.   Skin:     General: Skin is warm and dry.   Neurological:      General: No focal deficit present.      Mental Status: He is alert.   Psychiatric:         Mood and Affect: Mood normal.         Assessment/Plan:     1. Hyperlipidemia with target LDL less than 100  Assessment & Plan:  Goal for LDL less than 99.  We will notify with lab when available continue Pravachol 40 until labs available.        2. Primary hypertension  Assessment & Plan:  Continue Benicar HCT 40/25 daily well-controlled blood pressure low-sodium diet goal for blood pressure less than 130/80.      3. H/O vitamin D deficiency  Assessment & Plan:  Will notify with results of lab draw when available. Continue current treatment until results available.         4. Need for hepatitis C screening test    5. Impaired fasting glucose  Assessment & Plan:  We will check hemoglobin A1c discussion with low animal fats increase plant fats healthy proteins in diet.      6. Screening for malignant neoplasm of prostate  Assessment & Plan:  Will notify with results of lab draw when available. Continue current treatment until results available.         7. Screening for AAA (abdominal aortic aneurysm)  Assessment & Plan:  Had only smoked for a very short time when very young no smoking other than this but has never been screened to ensure that there is no AAA.  We will schedule at a time that is convenient for him.      8.  Need for prophylactic vaccination against Streptococcus pneumoniae (pneumococcus)  Assessment & Plan:  Will provide flu vaccine when available. Discussed possible side effects.       9. BMI 35.0-35.9,adult  Assessment & Plan:  Recommend weight loss with healthy food intake. Exercising approximately 30-40 minutes with exercise.          No follow-ups on file.          Discussed the diagnosis, prognosis, plan of care, chronic nature of and indications for, risks and benefits of treatment for conditions.  Continue all medications as prescribed unless otherwise noted.   Call if patient develops other symptoms or if not better in 2-3 days and sooner if worse. Emphasized the importance of compliance with all recommendations, including medication use and timely follow up. Instructed to return as noted be or sooner if patient develops any other problems or if there are any other additional questions or concerns.

## 2023-09-05 NOTE — ASSESSMENT & PLAN NOTE
Recommend weight loss with healthy food intake. Exercising approximately 30-40 minutes with exercise.

## 2023-09-05 NOTE — ASSESSMENT & PLAN NOTE
We will check hemoglobin A1c discussion with low animal fats increase plant fats healthy proteins in diet.

## 2023-09-05 NOTE — ASSESSMENT & PLAN NOTE
Goal for LDL less than 99.  We will notify with lab when available continue Pravachol 40 until labs available.

## 2023-09-05 NOTE — ASSESSMENT & PLAN NOTE
Continue Benicar HCT 40/25 daily well-controlled blood pressure low-sodium diet goal for blood pressure less than 130/80.

## 2023-09-06 ENCOUNTER — TELEPHONE (OUTPATIENT)
Dept: FAMILY MEDICINE | Facility: CLINIC | Age: 68
End: 2023-09-06
Payer: MEDICARE

## 2023-09-06 LAB — MAYO GENERIC ORDERABLE RESULT: NORMAL

## 2023-09-06 NOTE — TELEPHONE ENCOUNTER
----- Message from Sil Joaquin DNP sent at 9/6/2023  6:18 AM CDT -----  Notify lab good but can decrease dose of vitamin-D currently up to 76 which is normal and therapeutic but does not want to become too high.  Continue Pravachol 40 q.h.s. for cholesterol recheck CMP FLP vitamin-D in 6 months PSA normal recheck in 1 year

## 2023-09-06 NOTE — TELEPHONE ENCOUNTER
Spouse is here for a visit and will notify her. recheck CMP FLP vitamin-D in 6 months PSA normal recheck in 1 year

## 2023-09-22 ENCOUNTER — HOSPITAL ENCOUNTER (OUTPATIENT)
Dept: RADIOLOGY | Facility: HOSPITAL | Age: 68
Discharge: HOME | End: 2023-09-22
Attending: NURSE PRACTITIONER
Payer: MEDICARE

## 2023-09-22 DIAGNOSIS — Z13.6 SCREENING FOR AAA (ABDOMINAL AORTIC ANEURYSM): ICD-10-CM

## 2023-09-22 PROCEDURE — 76775 US EXAM ABDO BACK WALL LIM: CPT | Mod: TC

## 2023-09-25 ENCOUNTER — TELEPHONE (OUTPATIENT)
Dept: FAMILY MEDICINE | Facility: CLINIC | Age: 68
End: 2023-09-25
Payer: MEDICARE

## 2023-09-25 NOTE — TELEPHONE ENCOUNTER
----- Message from Sil Joaquin DNP sent at 9/25/2023  6:26 AM CDT -----  Notify normal abdominal aorta and ultrasound retroperitoneal.

## 2023-10-19 DIAGNOSIS — E78.5 HYPERLIPIDEMIA, UNSPECIFIED HYPERLIPIDEMIA TYPE: ICD-10-CM

## 2023-10-19 RX ORDER — PRAVASTATIN SODIUM 40 MG/1
40 TABLET ORAL
Qty: 90 TABLET | Refills: 1 | Status: SHIPPED | OUTPATIENT
Start: 2023-10-19

## 2023-12-11 PROBLEM — Z13.6 SCREENING FOR AAA (ABDOMINAL AORTIC ANEURYSM): Status: RESOLVED | Noted: 2023-09-05 | Resolved: 2023-12-11

## 2024-01-11 DIAGNOSIS — I10 HYPERTENSION, UNSPECIFIED TYPE: ICD-10-CM

## 2024-01-11 RX ORDER — OLMESARTAN MEDOXOMIL AND HYDROCHLOROTHIAZIDE 40/25 40; 25 MG/1; MG/1
TABLET ORAL
Qty: 90 TABLET | Refills: 1 | Status: SHIPPED | OUTPATIENT
Start: 2024-01-11

## 2024-03-04 ENCOUNTER — OFFICE VISIT (OUTPATIENT)
Dept: FAMILY MEDICINE | Facility: CLINIC | Age: 69
End: 2024-03-04
Payer: MEDICARE

## 2024-03-04 VITALS
HEIGHT: 66 IN | WEIGHT: 217 LBS | TEMPERATURE: 98 F | OXYGEN SATURATION: 96 % | SYSTOLIC BLOOD PRESSURE: 124 MMHG | BODY MASS INDEX: 34.87 KG/M2 | HEART RATE: 61 BPM | DIASTOLIC BLOOD PRESSURE: 64 MMHG | RESPIRATION RATE: 20 BRPM

## 2024-03-04 DIAGNOSIS — R73.01 IMPAIRED FASTING GLUCOSE: ICD-10-CM

## 2024-03-04 DIAGNOSIS — I10 HYPERTENSION, UNSPECIFIED TYPE: ICD-10-CM

## 2024-03-04 DIAGNOSIS — Z86.39 H/O VITAMIN D DEFICIENCY: ICD-10-CM

## 2024-03-04 DIAGNOSIS — E55.9 VITAMIN D DEFICIENCY: ICD-10-CM

## 2024-03-04 DIAGNOSIS — I10 PRIMARY HYPERTENSION: ICD-10-CM

## 2024-03-04 DIAGNOSIS — M25.50 POLYARTHRALGIA: ICD-10-CM

## 2024-03-04 DIAGNOSIS — R53.83 FATIGUE, UNSPECIFIED TYPE: ICD-10-CM

## 2024-03-04 DIAGNOSIS — E78.5 HYPERLIPIDEMIA WITH TARGET LDL LESS THAN 100: Primary | ICD-10-CM

## 2024-03-04 DIAGNOSIS — Z23 IMMUNIZATION DUE: ICD-10-CM

## 2024-03-04 LAB
ALBUMIN SERPL-MCNC: 4.5 G/DL (ref 3.4–5)
ALBUMIN/GLOB SERPL: 1.9 RATIO
ALP SERPL-CCNC: 98 UNIT/L (ref 50–144)
ALT SERPL-CCNC: 30 UNIT/L (ref 1–45)
ANION GAP SERPL CALC-SCNC: 6 MEQ/L (ref 2–13)
AST SERPL-CCNC: 47 UNIT/L (ref 17–59)
BILIRUB SERPL-MCNC: 0.6 MG/DL (ref 0–1)
BUN SERPL-MCNC: 26 MG/DL (ref 7–20)
CALCIUM SERPL-MCNC: 9.9 MG/DL (ref 8.4–10.2)
CHLORIDE SERPL-SCNC: 104 MMOL/L (ref 98–110)
CHOLEST SERPL-MCNC: 125 MG/DL (ref 0–200)
CO2 SERPL-SCNC: 29 MMOL/L (ref 21–32)
CREAT SERPL-MCNC: 1.03 MG/DL (ref 0.66–1.25)
CREAT/UREA NIT SERPL: 25 (ref 12–20)
DEPRECATED CALCIDIOL+CALCIFEROL SERPL-MC: 58 NG/ML (ref 30–80)
EST. AVERAGE GLUCOSE BLD GHB EST-MCNC: 125.5 MG/DL (ref 70–115)
GFR SERPLBLD CREATININE-BSD FMLA CKD-EPI: 80 MLS/MIN/1.73/M2
GLOBULIN SER-MCNC: 2.4 GM/DL (ref 2–3.9)
GLUCOSE SERPL-MCNC: 112 MG/DL (ref 70–115)
HBA1C MFR BLD: 6 % (ref 4–6)
HDLC SERPL-MCNC: 29 MG/DL (ref 40–60)
LDLC SERPL DIRECT ASSAY-SCNC: 79 MG/DL (ref 30–100)
POTASSIUM SERPL-SCNC: 4 MMOL/L (ref 3.5–5.1)
PROT SERPL-MCNC: 6.9 GM/DL (ref 6.3–8.2)
SODIUM SERPL-SCNC: 139 MMOL/L (ref 135–145)
TRIGL SERPL-MCNC: 124 MG/DL (ref 30–200)
TSH SERPL-ACNC: 2.98 UIU/ML (ref 0.36–3.74)

## 2024-03-04 PROCEDURE — 80061 LIPID PANEL: CPT | Performed by: NURSE PRACTITIONER

## 2024-03-04 PROCEDURE — 82306 VITAMIN D 25 HYDROXY: CPT | Performed by: NURSE PRACTITIONER

## 2024-03-04 PROCEDURE — 83036 HEMOGLOBIN GLYCOSYLATED A1C: CPT | Performed by: NURSE PRACTITIONER

## 2024-03-04 PROCEDURE — 80053 COMPREHEN METABOLIC PANEL: CPT | Performed by: NURSE PRACTITIONER

## 2024-03-04 PROCEDURE — 84443 ASSAY THYROID STIM HORMONE: CPT | Performed by: NURSE PRACTITIONER

## 2024-03-04 PROCEDURE — 99214 OFFICE O/P EST MOD 30 MIN: CPT | Mod: ,,, | Performed by: NURSE PRACTITIONER

## 2024-03-04 PROCEDURE — G0008 ADMIN INFLUENZA VIRUS VAC: HCPCS | Mod: ,,, | Performed by: NURSE PRACTITIONER

## 2024-03-04 PROCEDURE — 90686 IIV4 VACC NO PRSV 0.5 ML IM: CPT | Mod: ,,, | Performed by: NURSE PRACTITIONER

## 2024-03-04 RX ORDER — OLMESARTAN MEDOXOMIL AND HYDROCHLOROTHIAZIDE 40/25 40; 25 MG/1; MG/1
1 TABLET ORAL DAILY
Qty: 90 TABLET | Refills: 1 | Status: SHIPPED | OUTPATIENT
Start: 2024-03-04

## 2024-03-04 NOTE — ASSESSMENT & PLAN NOTE
Elevated BMI at 35.  Goal for healthy weight loss through exercise and activity increase vegetables and fruits in diet decrease fat concentrated carbs in diet goal for weight loss 5% in the next 3 months. Check tsh, The current medical regimen is effective;  continue present plan and medications. Lost 4# with smaller food portions.

## 2024-03-04 NOTE — PROGRESS NOTES
"Subjective:       Patient ID: Durga Plata is a 68 y.o. male.    Chief Complaint: Hyperlipidemia (6 mth f/u and recheck fasting labs.)    The patient returns for follow-up with fasting labs and medication management.  He does experience hypertension and elevated cholesterol with a low vitamin-D.  We will notify when the labs are available has been eating healthier and already lost 4 lb trying to eat healthier remaining active and does have arthritis in many joints but doing all right taking naproxen without any side.  His blood pressure has been well-controlled under 120s over 70s to 80s until he gets excited.  He is resting well.      Review of Systems   Constitutional:  Negative for activity change and appetite change.   HENT:  Negative for nasal congestion, trouble swallowing and voice change.    Eyes: Negative.  Negative for visual disturbance.   Respiratory: Negative.  Negative for chest tightness, shortness of breath and wheezing.    Cardiovascular:  Negative for chest pain, palpitations and leg swelling.   Endocrine: Negative for cold intolerance, heat intolerance and polyuria.   Genitourinary:  Negative for bladder incontinence, difficulty urinating, flank pain and frequency.   Musculoskeletal: Negative.    Allergic/Immunologic: Negative for environmental allergies and food allergies.   Neurological:  Negative for vertigo, tremors, seizures, syncope, light-headedness, headaches and coordination difficulties.   Hematological:  Negative for adenopathy. Does not bruise/bleed easily.   Psychiatric/Behavioral:  Negative for agitation, sleep disturbance and suicidal ideas.          Objective:      Vitals:    03/04/24 0704   BP: 124/64   Pulse: 61   Resp: 20   Temp: 98 °F (36.7 °C)   SpO2: 96%   Weight: 98.4 kg (217 lb)   Height: 5' 6" (1.676 m)       Physical Exam  Vitals and nursing note reviewed.   Constitutional:       General: He is not in acute distress.     Appearance: He is not ill-appearing.   HENT:     "  Head: Normocephalic and atraumatic.      Mouth/Throat:      Mouth: Mucous membranes are moist.      Pharynx: Oropharynx is clear.   Eyes:      General: No scleral icterus.     Extraocular Movements: Extraocular movements intact.      Conjunctiva/sclera: Conjunctivae normal.      Pupils: Pupils are equal, round, and reactive to light.   Neck:      Vascular: No carotid bruit.   Cardiovascular:      Rate and Rhythm: Normal rate and regular rhythm.      Pulses: Normal pulses.      Heart sounds: Normal heart sounds. No murmur heard.     No friction rub. No gallop.   Pulmonary:      Effort: Pulmonary effort is normal. No respiratory distress.      Breath sounds: Normal breath sounds. No wheezing, rhonchi or rales.   Abdominal:      General: Abdomen is flat. Bowel sounds are normal. There is no distension.      Palpations: Abdomen is soft. There is no mass.      Tenderness: There is no abdominal tenderness.   Musculoskeletal:         General: Normal range of motion.      Cervical back: Normal range of motion and neck supple.   Skin:     General: Skin is warm and dry.   Neurological:      General: No focal deficit present.      Mental Status: He is alert.   Psychiatric:         Mood and Affect: Mood normal.         Assessment/Plan:     1. Hyperlipidemia with target LDL less than 100  Assessment & Plan:  Currently taking Pravachol 40 mg q.h.s. with a low fat diet.Will notify with results of lab draw when available. Continue current treatment until results available.       Orders:  -     Comprehensive Metabolic Panel  -     Lipid Panel    2. H/O vitamin D deficiency  Assessment & Plan:  Patient has a history of low vitamin-D continue supplement with 1000 daily. Will notify with results of lab draw when available. Continue current treatment until results available.           3. Impaired fasting glucose  Assessment & Plan:  History of impaired glucose fasting low-fat low concentrated carb diet recheck hemoglobin A1c continue  increase exercise. Will notify with results of lab draw when available. Continue current treatment until results available.       Orders:  -     Hemoglobin A1C    4. BMI 35.0-35.9,adult  Assessment & Plan:  Elevated BMI at 35.  Goal for healthy weight loss through exercise and activity increase vegetables and fruits in diet decrease fat concentrated carbs in diet goal for weight loss 5% in the next 3 months. Check tsh, The current medical regimen is effective;  continue present plan and medications. Lost 4# with smaller food portions.         5. Vitamin D deficiency  -     Vitamin D    6. Fatigue, unspecified type  -     TSH    7. Primary hypertension  Assessment & Plan:  Benicar 40/25 mg one daily. The current medical regimen is effective;  continue present plan and medications.        8. Hypertension, unspecified type    9. Polyarthralgia       No follow-ups on file.          Discussed the diagnosis, prognosis, plan of care, chronic nature of and indications for, risks and benefits of treatment for conditions.  Continue all medications as prescribed unless otherwise noted.   Call if patient develops other symptoms or if not better in 2-3 days and sooner if worse. Emphasized the importance of compliance with all recommendations, including medication use and timely follow up. Instructed to return as noted be or sooner if patient develops any other problems or if there are any other additional questions or concerns.

## 2024-03-04 NOTE — ASSESSMENT & PLAN NOTE
Patient has a history of low vitamin-D continue supplement with 1000 daily. Will notify with results of lab draw when available. Continue current treatment until results available.        Should patient become delirious overnight, give extra seroquel as per psych recommendations.  Seroquel has been changed to 25mg PO q8h scheduled.  Avoid sitter as patient must be sitter free for 24 hours to go to SNF.

## 2024-03-04 NOTE — ASSESSMENT & PLAN NOTE
Benicar 40/25 mg one daily. The current medical regimen is effective;  continue present plan and medications.

## 2024-03-04 NOTE — ASSESSMENT & PLAN NOTE
History of impaired glucose fasting low-fat low concentrated carb diet recheck hemoglobin A1c continue increase exercise. Will notify with results of lab draw when available. Continue current treatment until results available.

## 2024-03-04 NOTE — ASSESSMENT & PLAN NOTE
Currently taking Pravachol 40 mg q.h.s. with a low fat diet.Will notify with results of lab draw when available. Continue current treatment until results available.

## 2024-03-05 ENCOUNTER — TELEPHONE (OUTPATIENT)
Dept: FAMILY MEDICINE | Facility: CLINIC | Age: 69
End: 2024-03-05
Payer: MEDICARE

## 2024-03-05 NOTE — TELEPHONE ENCOUNTER
----- Message from Sil Joaquin DNP sent at 3/4/2024  6:07 PM CST -----  Notify patient stable results.  LDL at goal for risk factors but low HDL increase activity.  Continue with healthy eating and weight loss.  Hemoglobin A1c has continued to climb progressively now up to 6.0 which is prediabetic recommend continued weight loss and increased activity.  Vitamin-D and TSH therapeutic.  Continue vitamin-D supplementation with pravastatin 40 mg q.h.s. recheck CMP FLP hemoglobin A1c and PS a with CBC after 09/05/2024

## 2024-04-09 DIAGNOSIS — E78.5 HYPERLIPIDEMIA, UNSPECIFIED HYPERLIPIDEMIA TYPE: ICD-10-CM

## 2024-04-09 RX ORDER — PRAVASTATIN SODIUM 40 MG/1
40 TABLET ORAL
Qty: 90 TABLET | Refills: 1 | Status: SHIPPED | OUTPATIENT
Start: 2024-04-09

## 2024-09-09 PROCEDURE — 84153 ASSAY OF PSA TOTAL: CPT | Performed by: NURSE PRACTITIONER

## 2024-09-09 PROCEDURE — 80061 LIPID PANEL: CPT | Performed by: NURSE PRACTITIONER

## 2024-09-09 PROCEDURE — 83036 HEMOGLOBIN GLYCOSYLATED A1C: CPT | Performed by: NURSE PRACTITIONER

## 2024-09-09 PROCEDURE — 80053 COMPREHEN METABOLIC PANEL: CPT | Performed by: NURSE PRACTITIONER

## 2024-09-09 PROCEDURE — 85025 COMPLETE CBC W/AUTO DIFF WBC: CPT | Performed by: NURSE PRACTITIONER

## 2024-09-10 ENCOUNTER — TELEPHONE (OUTPATIENT)
Dept: FAMILY MEDICINE | Facility: CLINIC | Age: 69
End: 2024-09-10
Payer: MEDICARE
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
## 2024-09-11 ENCOUNTER — TELEPHONE (OUTPATIENT)
Dept: FAMILY MEDICINE | Facility: CLINIC | Age: 69
End: 2024-09-11

## 2024-09-11 DIAGNOSIS — I10 HYPERTENSION, UNSPECIFIED TYPE: ICD-10-CM

## 2024-09-11 DIAGNOSIS — E78.5 HYPERLIPIDEMIA, UNSPECIFIED HYPERLIPIDEMIA TYPE: ICD-10-CM

## 2024-09-11 RX ORDER — OLMESARTAN MEDOXOMIL AND HYDROCHLOROTHIAZIDE 40/25 40; 25 MG/1; MG/1
1 TABLET ORAL DAILY
Qty: 90 TABLET | Refills: 1 | Status: SHIPPED | OUTPATIENT
Start: 2024-09-11

## 2024-09-11 RX ORDER — PRAVASTATIN SODIUM 40 MG/1
40 TABLET ORAL NIGHTLY
Qty: 90 TABLET | Refills: 1 | Status: SHIPPED | OUTPATIENT
Start: 2024-09-11

## 2024-09-11 NOTE — TELEPHONE ENCOUNTER
Called to discuss lab. Increase water intake and heart healthy diet. Try to increase exercise. Vitamin D supplementation daily 5000 daily. Denied any urination problems. Continue pravachol 40 mg qhs, benicar continue. JALH. Recheck cmp, flp, vitamin D, hgb A1c in 6 months.

## 2024-10-21 ENCOUNTER — HOSPITAL ENCOUNTER (OUTPATIENT)
Dept: RADIOLOGY | Facility: HOSPITAL | Age: 69
Discharge: HOME | End: 2024-10-21
Attending: NURSE PRACTITIONER
Payer: MEDICARE

## 2024-10-21 ENCOUNTER — OFFICE VISIT (OUTPATIENT)
Dept: FAMILY MEDICINE | Facility: CLINIC | Age: 69
End: 2024-10-21
Payer: MEDICARE

## 2024-10-21 VITALS
SYSTOLIC BLOOD PRESSURE: 140 MMHG | HEIGHT: 66 IN | TEMPERATURE: 98 F | RESPIRATION RATE: 20 BRPM | BODY MASS INDEX: 33.75 KG/M2 | OXYGEN SATURATION: 98 % | HEART RATE: 88 BPM | WEIGHT: 210 LBS | DIASTOLIC BLOOD PRESSURE: 72 MMHG

## 2024-10-21 DIAGNOSIS — M54.50 LUMBAR PAIN WITH RADIATION DOWN RIGHT LEG: ICD-10-CM

## 2024-10-21 DIAGNOSIS — M79.604 LUMBAR PAIN WITH RADIATION DOWN RIGHT LEG: ICD-10-CM

## 2024-10-21 DIAGNOSIS — R10.30 GROIN PAIN, UNSPECIFIED LATERALITY: ICD-10-CM

## 2024-10-21 DIAGNOSIS — Z23 IMMUNIZATION DUE: ICD-10-CM

## 2024-10-21 DIAGNOSIS — I10 PRIMARY HYPERTENSION: ICD-10-CM

## 2024-10-21 DIAGNOSIS — R10.30 GROIN PAIN, UNSPECIFIED LATERALITY: Primary | ICD-10-CM

## 2024-10-21 PROCEDURE — 72220 X-RAY EXAM SACRUM TAILBONE: CPT | Mod: TC

## 2024-10-21 PROCEDURE — 99214 OFFICE O/P EST MOD 30 MIN: CPT | Mod: ,,, | Performed by: NURSE PRACTITIONER

## 2024-10-21 PROCEDURE — 72190 X-RAY EXAM OF PELVIS: CPT | Mod: TC

## 2024-10-21 PROCEDURE — 72110 X-RAY EXAM L-2 SPINE 4/>VWS: CPT | Mod: TC

## 2024-10-21 PROCEDURE — G0008 ADMIN INFLUENZA VIRUS VAC: HCPCS | Mod: ,,, | Performed by: NURSE PRACTITIONER

## 2024-10-21 PROCEDURE — 90653 IIV ADJUVANT VACCINE IM: CPT | Mod: ,,, | Performed by: NURSE PRACTITIONER

## 2024-10-21 PROCEDURE — 96372 THER/PROPH/DIAG INJ SC/IM: CPT | Mod: ,,, | Performed by: NURSE PRACTITIONER

## 2024-10-21 RX ORDER — NABUMETONE 500 MG/1
500 TABLET, FILM COATED ORAL 2 TIMES DAILY PRN
Qty: 14 TABLET | Refills: 1 | Status: SHIPPED | OUTPATIENT
Start: 2024-10-21

## 2024-10-21 RX ORDER — KETOROLAC TROMETHAMINE 30 MG/ML
60 INJECTION, SOLUTION INTRAMUSCULAR; INTRAVENOUS
Status: COMPLETED | OUTPATIENT
Start: 2024-10-21 | End: 2024-10-21

## 2024-10-21 RX ORDER — METHOCARBAMOL 500 MG/1
500 TABLET, FILM COATED ORAL 4 TIMES DAILY
Qty: 40 TABLET | Refills: 0 | Status: SHIPPED | OUTPATIENT
Start: 2024-10-21 | End: 2024-10-31

## 2024-10-21 RX ADMIN — KETOROLAC TROMETHAMINE 60 MG: 30 INJECTION, SOLUTION INTRAMUSCULAR; INTRAVENOUS at 01:10

## 2024-10-21 NOTE — ASSESSMENT & PLAN NOTE
Patient reported twisted getting out of truck and pulled groin. Adductor muscle strain. Ultrasound.

## 2024-10-21 NOTE — ASSESSMENT & PLAN NOTE
Started when straddling ladder to load truck felt right buttock pain but gradually over next two days pain intensified. Now feeling severe SI pain to buttock with difficulty bearing weight on right leg. Rated at worst between 8-10 hot poker to SI area and trung horses to hamstring with extension, into right calf and right lateral toes feeling numb when driving. Sitting on any surface also more painful. Using caps ation supplements to help, but pain pain not completely relieved and spasm in right leg. Worse with right leg weight bearing. Off loading helps pain. Begin physical therapy, begin robaxin 500 mg tid prn with relafen 500 mg bid prn with food for pain. Call if worsening with rest.

## 2024-10-21 NOTE — ASSESSMENT & PLAN NOTE
Benicar 40/25 mg one daily. Caution NSAID can increase blood pressure. Monitor at least daily and notify any changes. The current medical regimen is effective;  continue present plan and medications.

## 2024-10-21 NOTE — PROGRESS NOTES
"Patient ID: Durga Plata  : 1955     Chief Complaint: sciatica, groin  Allergies: Patient has No Known Allergies.     History of Present Illness:  The patient is a 69 y.o. White male who presents to clinic for evaluation and management with a chief complaint of sciatica, groin   HPI   Patient in clinic with pain to right sciatica and radiates down. Also having pain to right groin . States that he has been taking and leg and back pain relief. Was climbing steps to load truck last Wednesday or Thursday. Immediately felt     Past Medical History:  has a past medical history of Benign prostatic hyperplasia without urinary obstruction, History of nutritional deficiency, Hyphema, and Impaired fasting blood sugar.    Social History:  reports that he has quit smoking. His smoking use included cigarettes. He has been exposed to tobacco smoke. He has never used smokeless tobacco. He reports that he does not drink alcohol and does not use drugs.    Care Team: Patient Care Team:  Sil Joaquin DNP as PCP - General (Family Medicine)     Current Medications:  Current Outpatient Medications   Medication Instructions    ascorbate calcium (SANDRA-C ORAL) 1 capsule, Daily    co-enzyme Q-10 100 mg, Daily    methocarbamoL (ROBAXIN) 500 mg, Oral, 4 times daily    multivitamin (THERAGRAN) per tablet 1 tablet, Daily    nabumetone (RELAFEN) 500 mg, Oral, 2 times daily PRN    naproxen sodium (ANAPROX) 220 mg, Nightly    olmesartan-hydrochlorothiazide (BENICAR HCT) 40-25 mg per tablet 1 tablet, Oral, Daily    potassium citrate 99 mg Cap 1 capsule, Daily    pravastatin (PRAVACHOL) 40 mg, Oral, Nightly    vitamin D (VITAMIN D3) 1,000 Units, Daily       Review of Systems     Visit Vitals  BP (!) 140/72 (BP Location: Left arm, Patient Position: Sitting)   Pulse 88   Temp 98.1 °F (36.7 °C)   Resp 20   Ht 5' 6" (1.676 m)   Wt 95.3 kg (210 lb)   SpO2 98%   BMI 33.89 kg/m²       Physical Exam     Labs Reviewed:  Chemistry:  Lab Results "   Component Value Date     09/09/2024    K 4.1 09/09/2024    BUN 28 (H) 09/09/2024    CREATININE 1.05 09/09/2024    EGFRNORACEVR 78 09/09/2024    GLUCOSE 107 09/09/2024    CALCIUM 10.3 (H) 09/09/2024    ALKPHOS 90 09/09/2024    LABPROT 6.7 09/09/2024    ALBUMIN 4.2 09/09/2024    AST 39 09/09/2024    ALT 26 09/09/2024    [id number] 58.0 03/04/2024    TSH 2.980 03/04/2024    PSA 0.64 09/09/2024        Lab Results   Component Value Date    HGBA1C 5.8 09/09/2024        Hematology:  Lab Results   Component Value Date    WBC 7.97 09/09/2024    RBC 4.98 09/09/2024    HGB 14.6 09/09/2024    HCT 42.5 09/09/2024    MCV 85.3 09/09/2024    MCH 29.3 09/09/2024    MCHC 34.4 09/09/2024    RDW 12.3 09/09/2024     09/09/2024    MPV 11.1 09/09/2024       Lipid Panel:  Lab Results   Component Value Date    CHOL 126 09/09/2024    HDL 31 (L) 09/09/2024    LDLDIRECT 71.5 09/09/2024    TRIG 146 09/09/2024    TOTALCHOLEST 4.2 08/30/2021        Assessment & Plan:  1. Groin pain, unspecified laterality  Assessment & Plan:  Patient reported twisted getting out of truck and pulled groin. Adductor muscle strain. Ultrasound.     Orders:  -     Ambulatory referral/consult to Physical/Occupational Therapy; Future; Expected date: 10/28/2024  -     nabumetone (RELAFEN) 500 MG tablet; Take 1 tablet (500 mg total) by mouth 2 (two) times daily as needed for Pain.  Dispense: 14 tablet; Refill: 1    2. Primary hypertension  Assessment & Plan:  Benicar 40/25 mg one daily. Caution NSAID can increase blood pressure. Monitor at least daily and notify any changes. The current medical regimen is effective;  continue present plan and medications.       3. Lumbar pain with radiation down right leg  Assessment & Plan:  Started when straddling ladder to load truck felt right buttock pain but gradually over next two days pain intensified. Now feeling severe SI pain to buttock with difficulty bearing weight on right leg. Rated at worst between 8-10  hot poker to SI area and trung horses to hamstring with extension, into right calf and right lateral toes feeling numb when driving. Sitting on any surface also more painful. Using caps ation supplements to help, but pain pain not completely relieved and spasm in right leg. Worse with right leg weight bearing. Off loading helps pain. Begin physical therapy, begin robaxin 500 mg tid prn with relafen 500 mg bid prn with food for pain. Call if worsening with rest.     Orders:  -     Ambulatory referral/consult to Physical/Occupational Therapy; Future; Expected date: 10/28/2024  -     ketorolac injection 60 mg  -     methocarbamoL (ROBAXIN) 500 MG Tab; Take 1 tablet (500 mg total) by mouth 4 (four) times daily. for 10 days  Dispense: 40 tablet; Refill: 0  -     nabumetone (RELAFEN) 500 MG tablet; Take 1 tablet (500 mg total) by mouth 2 (two) times daily as needed for Pain.  Dispense: 14 tablet; Refill: 1         Future Appointments   Date Time Provider Department Center   3/12/2025  7:20 AM Sil Joaquin DNP [facility]       Follow up in about 7 weeks (around 12/9/2024). Call sooner if needed.    Sil Joaquin DNP

## 2024-10-22 ENCOUNTER — TELEPHONE (OUTPATIENT)
Dept: FAMILY MEDICINE | Facility: CLINIC | Age: 69
End: 2024-10-22
Payer: MEDICARE

## 2024-10-22 NOTE — TELEPHONE ENCOUNTER
----- Message from Sil Joaquin DNP sent at 10/22/2024  5:40 AM CDT -----  Notify no fracture or dislocation within pelvis but does have significant arthritis affecting hips and ligament/tendon in pelvis. May have pulled, or strained these and causing inflammation. Continue present course, medication and PT and return in 3 weeks for follow up.

## 2024-10-23 ENCOUNTER — TELEPHONE (OUTPATIENT)
Dept: FAMILY MEDICINE | Facility: CLINIC | Age: 69
End: 2024-10-23
Payer: MEDICARE

## 2024-10-23 NOTE — TELEPHONE ENCOUNTER
Spoke to patient's wife and she states better but not great. Going to Therapy tomorrow and hoping this will help. Instructed to call if any needs.

## 2024-11-06 NOTE — ASSESSMENT & PLAN NOTE
Benicar 40/25 mg one daily. Caution NSAID can increase blood pressure, but did not cause elevation. Monitor at least daily and notify any changes. The current medical regimen is effective;  continue present plan and medications.

## 2024-11-06 NOTE — ASSESSMENT & PLAN NOTE
Completed first month physical therapy with [facility]. Was taking relafen 500 mg bid prn and robaxin 500 mg tid prn muscle spasm. Decreased abiliftry to tolerate prolonged ambulation, using cane for stability with marked weakness to right lower extremity. Continue physical therapy, get MRI lumbar. Consider referral if needed for nerve impingement. Declines surgery as option.

## 2024-11-07 ENCOUNTER — OFFICE VISIT (OUTPATIENT)
Dept: FAMILY MEDICINE | Facility: CLINIC | Age: 69
End: 2024-11-07
Payer: MEDICARE

## 2024-11-07 VITALS
HEIGHT: 66 IN | WEIGHT: 204 LBS | RESPIRATION RATE: 20 BRPM | HEART RATE: 72 BPM | DIASTOLIC BLOOD PRESSURE: 62 MMHG | OXYGEN SATURATION: 97 % | BODY MASS INDEX: 32.78 KG/M2 | TEMPERATURE: 98 F | SYSTOLIC BLOOD PRESSURE: 125 MMHG

## 2024-11-07 DIAGNOSIS — M54.50 LUMBAR PAIN WITH RADIATION DOWN RIGHT LEG: Primary | ICD-10-CM

## 2024-11-07 DIAGNOSIS — M79.604 LUMBAR PAIN WITH RADIATION DOWN RIGHT LEG: Primary | ICD-10-CM

## 2024-11-07 DIAGNOSIS — I10 PRIMARY HYPERTENSION: ICD-10-CM

## 2024-11-07 DIAGNOSIS — M54.9 DORSALGIA, UNSPECIFIED: ICD-10-CM

## 2024-11-07 DIAGNOSIS — R10.30 GROIN PAIN, UNSPECIFIED LATERALITY: ICD-10-CM

## 2024-11-07 PROCEDURE — 99213 OFFICE O/P EST LOW 20 MIN: CPT | Mod: ,,, | Performed by: NURSE PRACTITIONER

## 2024-11-07 RX ORDER — NABUMETONE 500 MG/1
500 TABLET, FILM COATED ORAL 2 TIMES DAILY PRN
Qty: 14 TABLET | Refills: 1 | Status: SHIPPED | OUTPATIENT
Start: 2024-11-07

## 2024-11-07 RX ORDER — METHOCARBAMOL 500 MG/1
500 TABLET, FILM COATED ORAL 3 TIMES DAILY PRN
Qty: 40 TABLET | Refills: 0 | Status: SHIPPED | OUTPATIENT
Start: 2024-11-07

## 2024-11-07 NOTE — PROGRESS NOTES
Patient ID: Durga Plata  : 1955     Chief Complaint: Follow-up    Allergies: Patient has No Known Allergies.     History of Present Illness:  The patient is a 69 y.o. White male who presents to clinic for evaluation and management with a chief complaint of Follow-up   HPI Patient presents to clinic for three week follow up on back pain. Patient reports he is going to therapy once a week and it seems to be helping pain. Now rated as 6/10 rather than 10/10 but inactivity and many movement limitations present. Attending physical therapy regularly since last visit and walking with cane to prevent falls. Would like to continue physical therapy but concerned may not be able to climb into truck as before.      Past Medical History:  has a past medical history of Benign prostatic hyperplasia without urinary obstruction, History of nutritional deficiency, and Impaired fasting blood sugar.    Social History:  reports that he has quit smoking. His smoking use included cigarettes. He has been exposed to tobacco smoke. He has never used smokeless tobacco. He reports that he does not drink alcohol and does not use drugs.    Care Team: Patient Care Team:  Sil Joaquin DNP as PCP - General (Family Medicine)     Current Medications:  Current Outpatient Medications   Medication Instructions    ascorbate calcium (SANDRA-C ORAL) 1 capsule, Daily    co-enzyme Q-10 100 mg, Daily    methocarbamoL (ROBAXIN) 500 mg, Oral, 3 times daily PRN    multivitamin (THERAGRAN) per tablet 1 tablet, Daily    nabumetone (RELAFEN) 500 mg, Oral, 2 times daily PRN    naproxen sodium (ANAPROX) 220 mg, Nightly    olmesartan-hydrochlorothiazide (BENICAR HCT) 40-25 mg per tablet 1 tablet, Oral, Daily    potassium citrate 99 mg Cap 1 capsule, Daily    pravastatin (PRAVACHOL) 40 mg, Oral, Nightly    vitamin D (VITAMIN D3) 1,000 Units, Daily       Review of Systems     Visit Vitals  /62 (BP Location: Right arm, Patient Position: Sitting)  "  Pulse 72   Temp 97.5 °F (36.4 °C)   Resp 20   Ht 5' 6" (1.676 m)   Wt 92.5 kg (204 lb)   SpO2 97%   BMI 32.93 kg/m²       Physical Exam  Vitals and nursing note reviewed.   Constitutional:       Appearance: Normal appearance.   HENT:      Mouth/Throat:      Mouth: Mucous membranes are moist.      Pharynx: Oropharynx is clear.   Cardiovascular:      Rate and Rhythm: Normal rate and regular rhythm.      Pulses: Normal pulses.      Heart sounds: Normal heart sounds.   Pulmonary:      Effort: Pulmonary effort is normal.      Breath sounds: Normal breath sounds.   Abdominal:      General: Bowel sounds are normal.      Palpations: Abdomen is soft.   Musculoskeletal:      Cervical back: Normal range of motion.      Thoracic back: Normal.      Lumbar back: Tenderness present. No bony tenderness. Decreased range of motion. Negative right straight leg raise test and negative left straight leg raise test.        Back:       Comments: Muscle spasm with SI point tenderness, diminished flexion with discomfort. Right patellar reflex diminished at 1/4 while right 2/4. No muscle atrophy   Neurological:      Mental Status: He is alert.          Labs Reviewed:  Chemistry:  Lab Results   Component Value Date     09/09/2024    K 4.1 09/09/2024    BUN 28 (H) 09/09/2024    CREATININE 1.05 09/09/2024    EGFRNORACEVR 78 09/09/2024    GLUCOSE 107 09/09/2024    CALCIUM 10.3 (H) 09/09/2024    ALKPHOS 90 09/09/2024    LABPROT 6.7 09/09/2024    ALBUMIN 4.2 09/09/2024    AST 39 09/09/2024    ALT 26 09/09/2024    [id number] 58.0 03/04/2024    TSH 2.980 03/04/2024    PSA 0.64 09/09/2024        Lab Results   Component Value Date    HGBA1C 5.8 09/09/2024        Hematology:  Lab Results   Component Value Date    WBC 7.97 09/09/2024    RBC 4.98 09/09/2024    HGB 14.6 09/09/2024    HCT 42.5 09/09/2024    MCV 85.3 09/09/2024    MCH 29.3 09/09/2024    MCHC 34.4 09/09/2024    RDW 12.3 09/09/2024     09/09/2024    MPV 11.1 09/09/2024 "       Lipid Panel:  Lab Results   Component Value Date    CHOL 126 09/09/2024    HDL 31 (L) 09/09/2024    LDLDIRECT 71.5 09/09/2024    TRIG 146 09/09/2024    TOTALCHOLEST 4.2 08/30/2021        Assessment & Plan:  1. Lumbar pain with radiation down right leg  Assessment & Plan:  Completed first month physical therapy with [facility]. Was taking relafen 500 mg bid prn and robaxin 500 mg tid prn muscle spasm. Decreased abiliftry to tolerate prolonged ambulation, using cane for stability with marked weakness to right lower extremity. Continue physical therapy, get MRI lumbar. Consider referral if needed for nerve impingement. Declines surgery as option.     Orders:  -     MRI Lumbar Spine Without Contrast; Future; Expected date: 11/07/2024  -     nabumetone (RELAFEN) 500 MG tablet; Take 1 tablet (500 mg total) by mouth 2 (two) times daily as needed for Pain.  Dispense: 14 tablet; Refill: 1  -     methocarbamoL (ROBAXIN) 500 MG Tab; Take 1 tablet (500 mg total) by mouth 3 (three) times daily as needed (muscle spasm).  Dispense: 40 tablet; Refill: 0    2. Primary hypertension  Assessment & Plan:  Benicar 40/25 mg one daily. Caution NSAID can increase blood pressure, but did not cause elevation. Monitor at least daily and notify any changes. The current medical regimen is effective;  continue present plan and medications.       3. Groin pain, unspecified laterality  Assessment & Plan:  Patient has completed physical therapy.     Orders:  -     nabumetone (RELAFEN) 500 MG tablet; Take 1 tablet (500 mg total) by mouth 2 (two) times daily as needed for Pain.  Dispense: 14 tablet; Refill: 1    4. Dorsalgia, unspecified  -     MRI Lumbar Spine Without Contrast; Future; Expected date: 11/07/2024         Future Appointments   Date Time Provider Department Center   12/9/2024  9:00 AM Sil oJaquin DNP [facility] [place]   3/12/2025  7:20 AM Sil Joaquin DNP [facility] [place]       No follow-ups on  file. Call sooner if needed.    Sil Joaquin, SARAH    Lab Frequency Next Occurrence   Ambulatory referral/consult to Physical/Occupational Therapy Once 10/28/2024

## 2024-11-25 ENCOUNTER — HOSPITAL ENCOUNTER (OUTPATIENT)
Dept: RADIOLOGY | Facility: HOSPITAL | Age: 69
Discharge: HOME | End: 2024-11-25
Attending: NURSE PRACTITIONER
Payer: MEDICARE

## 2024-11-25 DIAGNOSIS — M54.50 LUMBAR PAIN WITH RADIATION DOWN RIGHT LEG: ICD-10-CM

## 2024-11-25 DIAGNOSIS — M54.9 DORSALGIA, UNSPECIFIED: ICD-10-CM

## 2024-11-25 DIAGNOSIS — M79.604 LUMBAR PAIN WITH RADIATION DOWN RIGHT LEG: ICD-10-CM

## 2024-11-25 PROCEDURE — 72148 MRI LUMBAR SPINE W/O DYE: CPT | Mod: TC

## 2024-11-26 ENCOUNTER — TELEPHONE (OUTPATIENT)
Dept: FAMILY MEDICINE | Facility: CLINIC | Age: 69
End: 2024-11-26
Payer: MEDICARE

## 2024-11-26 NOTE — TELEPHONE ENCOUNTER
Patient notified and will discuss with wife and let us know about referral to LOS. Will call us and let us know.

## 2024-11-26 NOTE — TELEPHONE ENCOUNTER
----- Message from Sil Joaquin DNP sent at 11/26/2024  5:39 AM CST -----  Spondylosis worst at L3-4 level with severe left narrowing and moderate right narrowing of lateral nerve without spinal canal stenosis. And L4-5 severe right and left foraminal stenosis. Tried Patient without full help, refer to LOS if desires for possible injection to improve swelling and pain.

## 2024-12-09 ENCOUNTER — OFFICE VISIT (OUTPATIENT)
Dept: FAMILY MEDICINE | Facility: CLINIC | Age: 69
End: 2024-12-09
Payer: MEDICARE

## 2024-12-09 VITALS
SYSTOLIC BLOOD PRESSURE: 122 MMHG | BODY MASS INDEX: 33.27 KG/M2 | RESPIRATION RATE: 20 BRPM | HEIGHT: 66 IN | OXYGEN SATURATION: 96 % | TEMPERATURE: 98 F | DIASTOLIC BLOOD PRESSURE: 62 MMHG | WEIGHT: 207 LBS | HEART RATE: 105 BPM

## 2024-12-09 DIAGNOSIS — I10 PRIMARY HYPERTENSION: ICD-10-CM

## 2024-12-09 DIAGNOSIS — R10.30 GROIN PAIN, UNSPECIFIED LATERALITY: ICD-10-CM

## 2024-12-09 DIAGNOSIS — M79.604 LUMBAR PAIN WITH RADIATION DOWN RIGHT LEG: Primary | ICD-10-CM

## 2024-12-09 DIAGNOSIS — M54.50 LUMBAR PAIN WITH RADIATION DOWN RIGHT LEG: Primary | ICD-10-CM

## 2024-12-09 DIAGNOSIS — I10 HYPERTENSION, UNSPECIFIED TYPE: ICD-10-CM

## 2024-12-09 PROCEDURE — 99214 OFFICE O/P EST MOD 30 MIN: CPT | Mod: ,,, | Performed by: NURSE PRACTITIONER

## 2024-12-09 RX ORDER — NABUMETONE 500 MG/1
500 TABLET, FILM COATED ORAL 2 TIMES DAILY PRN
Qty: 14 TABLET | Refills: 1 | Status: SHIPPED | OUTPATIENT
Start: 2024-12-09

## 2024-12-09 RX ORDER — ASCORBIC ACID 500 MG
500 TABLET ORAL DAILY
COMMUNITY

## 2024-12-09 RX ORDER — METHOCARBAMOL 500 MG/1
500 TABLET, FILM COATED ORAL 3 TIMES DAILY PRN
Qty: 40 TABLET | Refills: 0 | Status: SHIPPED | OUTPATIENT
Start: 2024-12-09

## 2024-12-09 RX ORDER — OLMESARTAN MEDOXOMIL AND HYDROCHLOROTHIAZIDE 40/25 40; 25 MG/1; MG/1
1 TABLET ORAL DAILY
Qty: 90 TABLET | Refills: 1 | Status: SHIPPED | OUTPATIENT
Start: 2024-12-09

## 2024-12-09 NOTE — ASSESSMENT & PLAN NOTE
physical therapy with [facility].  relafen 500 mg bid prn and robaxin 500 mg tid prn muscle spasm. Decreased abiliftry to tolerate prolonged ambulation, using cane for stability with marked weakness to right lower extremity. Completed physical therapy,  MRI lumbar.impingement and right hamstring pain has improved with physical therapy but declines at this time injection for comfort.  Declines surgery as option.

## 2024-12-09 NOTE — PROGRESS NOTES
Patient ID: Durga Plata  : 1955     Chief Complaint: Follow-up (On back pain)    Allergies: Patient has No Known Allergies.     History of Present Illness:  The patient is a 69 y.o. White male who presents to clinic for evaluation and management with a chief complaint of Follow-up (On back pain)   HPI   Patient in clinic with follow-up on back pain. Patient states that back pain has improved. States that he missed therapy last week due to knee pain. Has narrowing of spine. States that he isn't a fan needles.  Also had continued physical therapy. Then strained right knee. Still walking with cane and slight limp. Now recovering. Cold last week resolved. .    Past Medical History:  has a past medical history of Benign prostatic hyperplasia without urinary obstruction, History of nutritional deficiency, and Impaired fasting blood sugar.    Social History:  reports that he has quit smoking. His smoking use included cigarettes. He has been exposed to tobacco smoke. He has never used smokeless tobacco. He reports that he does not drink alcohol and does not use drugs.    Care Team: Patient Care Team:  Sil Joaquin DNP as PCP - General (Family Medicine)     Current Medications:  Current Outpatient Medications   Medication Instructions    ascorbate calcium (SANDRA-C ORAL) 1 capsule, Daily    ascorbic acid (vitamin C) (VITAMIN C) 500 mg, Daily    co-enzyme Q-10 100 mg, Daily    methocarbamoL (ROBAXIN) 500 mg, Oral, 3 times daily PRN    multivitamin (THERAGRAN) per tablet 1 tablet, Daily    nabumetone (RELAFEN) 500 mg, Oral, 2 times daily PRN    olmesartan-hydrochlorothiazide (BENICAR HCT) 40-25 mg per tablet 1 tablet, Oral, Daily    potassium citrate 99 mg Cap 1 capsule, Daily    pravastatin (PRAVACHOL) 40 mg, Oral, Nightly    vitamin D (VITAMIN D3) 1,000 Units, Daily       Review of Systems   Constitutional:  Negative for activity change, appetite change, fever and night sweats.   HENT:  Positive for postnasal  "drip.    Respiratory:  Positive for cough. Negative for shortness of breath and wheezing.    Cardiovascular:  Positive for leg swelling (right knee swelling with injury).   Gastrointestinal: Negative.    Endocrine: Negative.    Musculoskeletal:  Positive for arthralgias, back pain and leg pain (knee now walking block day and improving).   Allergic/Immunologic: Positive for environmental allergies.   Neurological:  Negative for weakness, numbness and coordination difficulties.   Hematological: Negative.    Psychiatric/Behavioral: Negative.          Visit Vitals  /62 (BP Location: Left arm, Patient Position: Sitting)   Pulse 105   Temp 98.2 °F (36.8 °C)   Resp 20   Ht 5' 6" (1.676 m)   Wt 93.9 kg (207 lb)   SpO2 96%   BMI 33.41 kg/m²       Physical Exam  Vitals and nursing note reviewed.   Constitutional:       Appearance: Normal appearance.   HENT:      Mouth/Throat:      Mouth: Mucous membranes are moist.      Pharynx: Oropharynx is clear.   Cardiovascular:      Rate and Rhythm: Normal rate and regular rhythm.      Pulses: Normal pulses.      Heart sounds: Normal heart sounds.   Pulmonary:      Effort: Pulmonary effort is normal.      Breath sounds: Normal breath sounds.   Abdominal:      General: Bowel sounds are normal.      Palpations: Abdomen is soft.   Musculoskeletal:      Cervical back: Normal range of motion.      Thoracic back: Normal.      Lumbar back: Tenderness present. No bony tenderness. Decreased range of motion. Negative right straight leg raise test and negative left straight leg raise test.        Back:       Comments: Muscle spasm without SI point tenderness, diminished flexion with discomfort. Walking with cane but improved   Neurological:      Mental Status: He is alert and oriented to person, place, and time.          Labs Reviewed:  Chemistry:  Lab Results   Component Value Date     09/09/2024    K 4.1 09/09/2024    BUN 28 (H) 09/09/2024    CREATININE 1.05 09/09/2024    " EGFRNORACEVR 78 09/09/2024    GLUCOSE 107 09/09/2024    CALCIUM 10.3 (H) 09/09/2024    ALKPHOS 90 09/09/2024    LABPROT 6.7 09/09/2024    ALBUMIN 4.2 09/09/2024    AST 39 09/09/2024    ALT 26 09/09/2024    QVZZBHHJ92IC 58.0 03/04/2024    TSH 2.980 03/04/2024    PSA 0.64 09/09/2024        Lab Results   Component Value Date    HGBA1C 5.8 09/09/2024        Hematology:  Lab Results   Component Value Date    WBC 7.97 09/09/2024    RBC 4.98 09/09/2024    HGB 14.6 09/09/2024    HCT 42.5 09/09/2024    MCV 85.3 09/09/2024    MCH 29.3 09/09/2024    MCHC 34.4 09/09/2024    RDW 12.3 09/09/2024     09/09/2024    MPV 11.1 09/09/2024       Lipid Panel:  Lab Results   Component Value Date    CHOL 126 09/09/2024    HDL 31 (L) 09/09/2024    LDLDIRECT 71.5 09/09/2024    TRIG 146 09/09/2024    TOTALCHOLEST 4.2 08/30/2021        Assessment & Plan:  1. Lumbar pain with radiation down right leg  Assessment & Plan:  physical therapy with [facility].  relafen 500 mg bid prn and robaxin 500 mg tid prn muscle spasm. Decreased abiliftry to tolerate prolonged ambulation, using cane for stability with marked weakness to right lower extremity. Completed physical therapy,  MRI lumbar.impingement and right hamstring pain has improved with physical therapy but declines at this time injection for comfort.  Declines surgery as option.       2. Primary hypertension  Assessment & Plan:  Benicar 40/25 mg one daily. Caution NSAID can increase blood pressure, but did not cause elevation. Monitor at least daily and notify any changes. The current medical regimen is effective;  continue present plan and medications.       3. Groin pain, unspecified laterality    4. Hypertension, unspecified type    5. BMI 33.0-33.9,adult  Assessment & Plan:  Goal for healthy weight loss through exercise and activity increase vegetables and fruits in diet decrease fat concentrated carbs in diet goal for weight loss The current medical regimen is effective;   continue present plan and medications.            Future Appointments   Date Time Provider Department Center   3/12/2025  7:20 AM Sil Joaquin DNP [facility]       Follow up in about 3 months (around 3/9/2025). Call sooner if needed.    Sil Joaquin DNP    Lab Frequency Next Occurrence   Ambulatory referral/consult to Physical/Occupational Therapy Once 10/28/2024

## 2024-12-09 NOTE — ASSESSMENT & PLAN NOTE
Goal for healthy weight loss through exercise and activity increase vegetables and fruits in diet decrease fat concentrated carbs in diet goal for weight loss The current medical regimen is effective;  continue present plan and medications.

## 2025-03-11 PROBLEM — Z86.39 H/O VITAMIN D DEFICIENCY: Status: RESOLVED | Noted: 2023-03-06 | Resolved: 2025-03-11

## 2025-03-11 NOTE — ASSESSMENT & PLAN NOTE
Benicar 40/25 mg one daily. Caution NSAID can increase blood pressure The current medical regimen is effective;  continue present plan and medications.\

## 2025-03-11 NOTE — ASSESSMENT & PLAN NOTE
Pravachol 40 mg q.h.s. with a low fat diet.Will notify with results of lab draw when available. Continue current treatment until results available.

## 2025-03-12 ENCOUNTER — OFFICE VISIT (OUTPATIENT)
Dept: FAMILY MEDICINE | Facility: CLINIC | Age: 70
End: 2025-03-12
Payer: MEDICARE

## 2025-03-12 ENCOUNTER — RESULTS FOLLOW-UP (OUTPATIENT)
Dept: FAMILY MEDICINE | Facility: CLINIC | Age: 70
End: 2025-03-12

## 2025-03-12 VITALS
OXYGEN SATURATION: 97 % | BODY MASS INDEX: 33.75 KG/M2 | HEART RATE: 64 BPM | DIASTOLIC BLOOD PRESSURE: 70 MMHG | WEIGHT: 210 LBS | TEMPERATURE: 98 F | RESPIRATION RATE: 20 BRPM | SYSTOLIC BLOOD PRESSURE: 120 MMHG | HEIGHT: 66 IN

## 2025-03-12 DIAGNOSIS — I10 HYPERTENSION, UNSPECIFIED TYPE: ICD-10-CM

## 2025-03-12 DIAGNOSIS — E66.2 OBESITY WITH ALVEOLAR HYPOVENTILATION AND BODY MASS INDEX (BMI) OF 30 TO LESS THAN 35: ICD-10-CM

## 2025-03-12 DIAGNOSIS — R73.01 IMPAIRED FASTING GLUCOSE: ICD-10-CM

## 2025-03-12 DIAGNOSIS — M79.604 LUMBAR PAIN WITH RADIATION DOWN RIGHT LEG: ICD-10-CM

## 2025-03-12 DIAGNOSIS — M54.50 LUMBAR PAIN WITH RADIATION DOWN RIGHT LEG: ICD-10-CM

## 2025-03-12 DIAGNOSIS — E78.5 HYPERLIPIDEMIA WITH TARGET LDL LESS THAN 100: Primary | ICD-10-CM

## 2025-03-12 DIAGNOSIS — I10 PRIMARY HYPERTENSION: ICD-10-CM

## 2025-03-12 LAB
ALBUMIN SERPL-MCNC: 4.5 G/DL (ref 3.4–5)
ALBUMIN/GLOB SERPL: 1.8 RATIO
ALP SERPL-CCNC: 93 UNIT/L (ref 50–144)
ALT SERPL-CCNC: 27 UNIT/L (ref 1–45)
ANION GAP SERPL CALC-SCNC: 5 MEQ/L (ref 2–13)
AST SERPL-CCNC: 41 UNIT/L (ref 17–59)
BILIRUB SERPL-MCNC: 0.8 MG/DL (ref 0–1)
BUN SERPL-MCNC: 29 MG/DL (ref 7–20)
CALCIUM SERPL-MCNC: 9.9 MG/DL (ref 8.4–10.2)
CHLORIDE SERPL-SCNC: 103 MMOL/L (ref 98–110)
CHOLEST SERPL-MCNC: 134 MG/DL (ref 0–200)
CO2 SERPL-SCNC: 30 MMOL/L (ref 21–32)
CREAT SERPL-MCNC: 0.98 MG/DL (ref 0.66–1.25)
CREAT/UREA NIT SERPL: 30 (ref 12–20)
EST. AVERAGE GLUCOSE BLD GHB EST-MCNC: 119.8 MG/DL (ref 70–115)
GFR SERPLBLD CREATININE-BSD FMLA CKD-EPI: 84 ML/MIN/1.73/M2
GLOBULIN SER-MCNC: 2.5 GM/DL (ref 2–3.9)
GLUCOSE SERPL-MCNC: 107 MG/DL (ref 70–115)
HBA1C MFR BLD: 5.8 % (ref 4–6)
HDLC SERPL-MCNC: 32 MG/DL (ref 40–60)
LDLC SERPL DIRECT ASSAY-SCNC: 78 MG/DL (ref 30–100)
POTASSIUM SERPL-SCNC: 4.2 MMOL/L (ref 3.5–5.1)
PROT SERPL-MCNC: 7 GM/DL (ref 6.3–8.2)
SODIUM SERPL-SCNC: 138 MMOL/L (ref 136–145)
TRIGL SERPL-MCNC: 158 MG/DL (ref 30–200)

## 2025-03-12 PROCEDURE — 3078F DIAST BP <80 MM HG: CPT | Mod: ,,, | Performed by: NURSE PRACTITIONER

## 2025-03-12 PROCEDURE — 3074F SYST BP LT 130 MM HG: CPT | Mod: ,,, | Performed by: NURSE PRACTITIONER

## 2025-03-12 PROCEDURE — 1159F MED LIST DOCD IN RCRD: CPT | Mod: ,,, | Performed by: NURSE PRACTITIONER

## 2025-03-12 PROCEDURE — 80053 COMPREHEN METABOLIC PANEL: CPT | Performed by: NURSE PRACTITIONER

## 2025-03-12 PROCEDURE — 80061 LIPID PANEL: CPT | Performed by: NURSE PRACTITIONER

## 2025-03-12 PROCEDURE — 83036 HEMOGLOBIN GLYCOSYLATED A1C: CPT | Performed by: NURSE PRACTITIONER

## 2025-03-12 PROCEDURE — 99214 OFFICE O/P EST MOD 30 MIN: CPT | Mod: ,,, | Performed by: NURSE PRACTITIONER

## 2025-03-12 PROCEDURE — 1160F RVW MEDS BY RX/DR IN RCRD: CPT | Mod: ,,, | Performed by: NURSE PRACTITIONER

## 2025-03-12 PROCEDURE — 3008F BODY MASS INDEX DOCD: CPT | Mod: ,,, | Performed by: NURSE PRACTITIONER

## 2025-03-12 RX ORDER — OLMESARTAN MEDOXOMIL AND HYDROCHLOROTHIAZIDE 40/25 40; 25 MG/1; MG/1
1 TABLET ORAL DAILY
Qty: 90 TABLET | Refills: 1 | Status: SHIPPED | OUTPATIENT
Start: 2025-03-12

## 2025-03-12 NOTE — ASSESSMENT & PLAN NOTE
[facility] every 3 weeks. Still with right hamstring tightness. Symptoms continue to improve but still present. No longer taking muscle relaxant. Taking alleve and ibuprofen.

## 2025-03-12 NOTE — PROGRESS NOTES
Patient ID: Durga Plata  : 1955     Chief Complaint: Follow-up (Patient presents for three month follow up with fasting blood work. )    Allergies: Patient has no known allergies.     History of Present Illness:  The patient is a 69 y.o. White male who presents to clinic for evaluation and management with a chief complaint of Follow-up (Patient presents for three month follow up with fasting blood work. )   History of Present Illness      Added magnesium and stopped working on continuing with physical therapy every 3 weeks and find continued with the lumbar pain still with posterior right thigh tightness but pain has improved.  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  Taking all of his supplements and here for fasting labs today         Past Medical History:  has a past medical history of Benign prostatic hyperplasia without urinary obstruction, History of nutritional deficiency, and Impaired fasting blood sugar.    Social History:  reports that he has quit smoking. His smoking use included cigarettes. He has been exposed to tobacco smoke. He has never used smokeless tobacco. He reports that he does not drink alcohol and does not use drugs.    Care Team: Patient Care Team:  Sil Joaquin DNP as PCP - General (Family Medicine)     Current Medications:  Current Outpatient Medications   Medication Instructions    ascorbic acid (vitamin C) (VITAMIN C) 500 mg, Daily    co-enzyme Q-10 100 mg, Daily    multivitamin (THERAGRAN) per tablet 1 tablet, Daily    olmesartan-hydrochlorothiazide (BENICAR HCT) 40-25 mg per tablet 1 tablet, Oral, Daily    potassium citrate 99 mg Cap 1 capsule, Daily    pravastatin (PRAVACHOL) 40 mg, Oral, Nightly    vitamin D (VITAMIN D3) 1,000 Units, Daily       Review of Systems   HENT:  Positive for rhinorrhea.    Gastrointestinal: Negative.    Musculoskeletal:  Positive for arthralgias and back pain.   Integumentary:  Negative.   Hematological:  "Negative.    Psychiatric/Behavioral:  Negative for sleep disturbance.    All other systems reviewed and are negative.       Visit Vitals  /70 (BP Location: Right arm)   Pulse 64   Temp 97.5 °F (36.4 °C)   Resp 20   Ht 5' 6" (1.676 m)   Wt 95.3 kg (210 lb)   SpO2 97%   BMI 33.89 kg/m²       Physical Exam  Vitals and nursing note reviewed.   Constitutional:       General: He is not in acute distress.     Appearance: He is not ill-appearing.   HENT:      Head: Normocephalic and atraumatic.      Nose: Rhinorrhea present.      Mouth/Throat:      Mouth: Mucous membranes are moist.      Pharynx: Oropharynx is clear.   Eyes:      General: No scleral icterus.     Extraocular Movements: Extraocular movements intact.      Conjunctiva/sclera: Conjunctivae normal.      Pupils: Pupils are equal, round, and reactive to light.   Neck:      Vascular: No carotid bruit.   Cardiovascular:      Rate and Rhythm: Normal rate and regular rhythm.      Pulses: Normal pulses.      Heart sounds: Normal heart sounds. No murmur heard.     No friction rub. No gallop.   Pulmonary:      Effort: Pulmonary effort is normal. No respiratory distress.      Breath sounds: Normal breath sounds. No wheezing, rhonchi or rales.   Abdominal:      General: Abdomen is flat. Bowel sounds are normal. There is no distension.      Palpations: Abdomen is soft. There is no mass.      Tenderness: There is no abdominal tenderness.   Musculoskeletal:         General: Normal range of motion.      Cervical back: Normal range of motion and neck supple.   Skin:     General: Skin is warm and dry.   Neurological:      General: No focal deficit present.      Mental Status: He is alert and oriented to person, place, and time.   Psychiatric:         Mood and Affect: Mood normal.         Behavior: Behavior normal.          Labs Reviewed:  Chemistry:  Lab Results   Component Value Date     09/09/2024    K 4.1 09/09/2024    BUN 28 (H) 09/09/2024    CREATININE 1.05 " 09/09/2024    EGFRNORACEVR 78 09/09/2024    GLUCOSE 107 09/09/2024    CALCIUM 10.3 (H) 09/09/2024    ALKPHOS 90 09/09/2024    LABPROT 6.7 09/09/2024    ALBUMIN 4.2 09/09/2024    AST 39 09/09/2024    ALT 26 09/09/2024    CLTVFVSE16QR 58.0 03/04/2024    TSH 2.980 03/04/2024    PSA 0.64 09/09/2024        Lab Results   Component Value Date    HGBA1C 5.8 09/09/2024        Hematology:  Lab Results   Component Value Date    WBC 7.97 09/09/2024    RBC 4.98 09/09/2024    HGB 14.6 09/09/2024    HCT 42.5 09/09/2024    MCV 85.3 09/09/2024    MCH 29.3 09/09/2024    MCHC 34.4 09/09/2024    RDW 12.3 09/09/2024     09/09/2024    MPV 11.1 09/09/2024       Lipid Panel:  Lab Results   Component Value Date    CHOL 126 09/09/2024    HDL 31 (L) 09/09/2024    LDLDIRECT 71.5 09/09/2024    TRIG 146 09/09/2024    TOTALCHOLEST 4.2 08/30/2021        Assessment & Plan:  1. Hyperlipidemia with target LDL less than 100  Assessment & Plan:   Pravachol 40 mg q.h.s. with a low fat diet.Will notify with results of lab draw when available. Continue current treatment until results available.     Orders:  -     Comprehensive Metabolic Panel  -     Lipid Panel    2. Primary hypertension  Assessment & Plan:  Benicar 40/25 mg one daily. Caution NSAID can increase blood pressure The current medical regimen is effective;  continue present plan and medications.\      3. Impaired fasting glucose  Assessment & Plan:  History of impaired glucose fasting low-fat low concentrated carb diet recheck hemoglobin A1c continue increase exercise. Will notify with results of lab draw when available. Continue current treatment until results available.     Orders:  -     Hemoglobin A1C    4. Lumbar pain with radiation down right leg  Assessment & Plan:  [facility] every 3 weeks. Still with right hamstring tightness. Symptoms continue to improve but still present. No longer taking muscle relaxant. Taking alleve and ibuprofen.       5. Obesity with  alveolar hypoventilation and body mass index (BMI) of 30 to less than 35  Assessment & Plan:  Goal for healthy weight loss through exercise and activity increase vegetables and fruits in diet decrease fat concentrated carbs in diet goal for weight loss The current medical regimen is effective; continue present plan and medications.       6. Hypertension, unspecified type         Assessment & Plan               No future appointments.      Follow up in about 3 months (around 6/12/2025). Call sooner if needed.      This note was generated with the assistance of ambient listening technology. Verbal consent was obtained by the patient and accompanying visitor(s) for the recording of patient appointment to facilitate this note. I attest to having reviewed and edited the generated note for accuracy, though some syntax or spelling errors may persist. Please contact the author of this note for any clarification.      Sil Joaquin, SARAH    Lab Frequency Next Occurrence   Ambulatory referral/consult to Physical/Occupational Therapy Once 10/28/2024

## 2025-03-13 ENCOUNTER — TELEPHONE (OUTPATIENT)
Dept: FAMILY MEDICINE | Facility: CLINIC | Age: 70
End: 2025-03-13
Payer: MEDICARE

## 2025-03-13 NOTE — TELEPHONE ENCOUNTER
----- Message from Sil Joaquin DNP sent at 3/12/2025  4:45 PM CDT -----  Notify therapeutic , refill and continue pravastatin 40 mg qhs, and medications. Recheck cbc, cmp, flp, tsh, psa in 6 months after 9/10/25.   ----- Message -----  From: Lab, Background User  Sent: 3/12/2025   3:46 PM CDT  To: Sil Joaquin DNP

## 2025-06-09 ENCOUNTER — OFFICE VISIT (OUTPATIENT)
Dept: FAMILY MEDICINE | Facility: CLINIC | Age: 70
End: 2025-06-09
Payer: MEDICARE

## 2025-06-09 VITALS
TEMPERATURE: 98 F | WEIGHT: 214 LBS | HEIGHT: 66 IN | DIASTOLIC BLOOD PRESSURE: 70 MMHG | BODY MASS INDEX: 34.39 KG/M2 | SYSTOLIC BLOOD PRESSURE: 130 MMHG | OXYGEN SATURATION: 95 % | HEART RATE: 96 BPM

## 2025-06-09 DIAGNOSIS — I10 HYPERTENSION, UNSPECIFIED TYPE: ICD-10-CM

## 2025-06-09 DIAGNOSIS — Z00.00 MEDICARE ANNUAL WELLNESS VISIT, SUBSEQUENT: ICD-10-CM

## 2025-06-09 DIAGNOSIS — I10 PRIMARY HYPERTENSION: ICD-10-CM

## 2025-06-09 DIAGNOSIS — E66.9 OBESITY (BMI 35.0-39.9 WITHOUT COMORBIDITY): ICD-10-CM

## 2025-06-09 DIAGNOSIS — N40.0 BENIGN PROSTATIC HYPERPLASIA WITHOUT URINARY OBSTRUCTION: ICD-10-CM

## 2025-06-09 DIAGNOSIS — M54.50 LUMBAR PAIN WITH RADIATION DOWN RIGHT LEG: ICD-10-CM

## 2025-06-09 DIAGNOSIS — I70.0 AORTIC ATHEROSCLEROSIS: Primary | ICD-10-CM

## 2025-06-09 DIAGNOSIS — M79.604 LUMBAR PAIN WITH RADIATION DOWN RIGHT LEG: ICD-10-CM

## 2025-06-09 DIAGNOSIS — Z71.89 ACP (ADVANCE CARE PLANNING): ICD-10-CM

## 2025-06-09 PROCEDURE — 1160F RVW MEDS BY RX/DR IN RCRD: CPT | Mod: ,,, | Performed by: NURSE PRACTITIONER

## 2025-06-09 PROCEDURE — G0439 PPPS, SUBSEQ VISIT: HCPCS | Mod: ,,, | Performed by: NURSE PRACTITIONER

## 2025-06-09 PROCEDURE — 1159F MED LIST DOCD IN RCRD: CPT | Mod: ,,, | Performed by: NURSE PRACTITIONER

## 2025-06-09 PROCEDURE — 3075F SYST BP GE 130 - 139MM HG: CPT | Mod: ,,, | Performed by: NURSE PRACTITIONER

## 2025-06-09 PROCEDURE — 1124F ACP DISCUSS-NO DSCNMKR DOCD: CPT | Mod: ,,, | Performed by: NURSE PRACTITIONER

## 2025-06-09 PROCEDURE — 3044F HG A1C LEVEL LT 7.0%: CPT | Mod: ,,, | Performed by: NURSE PRACTITIONER

## 2025-06-09 PROCEDURE — 3078F DIAST BP <80 MM HG: CPT | Mod: ,,, | Performed by: NURSE PRACTITIONER

## 2025-06-09 RX ORDER — OLMESARTAN MEDOXOMIL AND HYDROCHLOROTHIAZIDE 40/25 40; 25 MG/1; MG/1
1 TABLET ORAL DAILY
Qty: 90 TABLET | Refills: 1 | Status: SHIPPED | OUTPATIENT
Start: 2025-06-09

## 2025-06-09 NOTE — ASSESSMENT & PLAN NOTE
Declined seeing cardiologist, continue pravastatin 40 mg qhs and low fat low chol diet. The current medical regimen is effective;  continue present plan and medications.

## 2025-06-09 NOTE — PATIENT INSTRUCTIONS
Randall Calixto,     If you are due for any health screening(s) below please notify me so we can arrange them to be ordered and scheduled. Most healthy patients at your age complete them, but you are free to accept or refuse.     If you can't do it, I'll definitely understand. If you can, I'd certainly appreciate it!    All of your core healthy metrics are met.

## 2025-06-09 NOTE — PROGRESS NOTES
Ambulatory [facility] [place]      Patient ID: [id number]     Chief Complaint: Medicare Annual Wellness     HPI:     Druga Plata is a 69 y.o. male here today for a Medicare Annual Wellness visit and comprehensive Health Risk Assessment.     A separate E/M code has been provided to evaluate additional complaints that the patient would like addressed during the dedicated Medicare Wellness Exam.    HPI   Still with lumbar pain but helping with twice daily exercising. Returned to work and driving truck. Tolerating activity without problems. Discussed living will does not have current plans.     Health Maintenance   Topic Date Due    Shingles Vaccine (1 of 2) Never done    RSV Vaccine (Age 60+ and Pregnant patients) (1 - Risk 60-74 years 1-dose series) 03/15/2034 (Originally 10/4/2016)    Hemoglobin A1c (Prediabetes)  03/12/2026    Colorectal Cancer Screening  07/18/2027    TETANUS VACCINE  02/24/2030    Lipid Panel  03/12/2030    Hepatitis C Screening  Completed    Influenza Vaccine  Completed    Pneumococcal Vaccines (Age 50+)  Completed    Abdominal Aortic Aneurysm Screening  Completed    COVID-19 Vaccine  Discontinued        Past Medical History:   Diagnosis Date    Benign prostatic hyperplasia without urinary obstruction     History of nutritional deficiency     Impaired fasting blood sugar         Past Surgical History:   Procedure Laterality Date    COLONOSCOPY  07/18/2022        Social History     Socioeconomic History    Marital status:     Number of children: 2   Occupational History    Occupation:    Tobacco Use    Smoking status: Former     Types: Cigarettes     Passive exposure: Past    Smokeless tobacco: Never   Substance and Sexual Activity    Alcohol use: Never    Drug use: Never    Sexual activity: Not Currently        Family History   Problem Relation Name Age of Onset    Colon cancer Mother          Current Outpatient Medications   Medication Instructions    ascorbic acid (vitamin C)  (VITAMIN C) 500 mg, Daily    co-enzyme Q-10 100 mg, Daily    multivitamin (THERAGRAN) per tablet 1 tablet, Daily    olmesartan-hydrochlorothiazide (BENICAR HCT) 40-25 mg per tablet 1 tablet, Oral, Daily    potassium citrate 99 mg Cap 1 capsule, Daily    pravastatin (PRAVACHOL) 40 mg, Oral, Nightly    vitamin D (VITAMIN D3) 1,000 Units, Daily       Review of patient's allergies indicates:  No Known Allergies     Immunization History   Administered Date(s) Administered    COVID-19 MRNA, LN-S PF (MODERNA HALF 0.25 ML DOSE) 01/02/2022    COVID-19, MRNA, LN-S, PF (MODERNA FULL 0.5 ML DOSE) 03/13/2021, 04/11/2021    COVID-19, mRNA, LNP-S, PF (Moderna) Ages 12+ 10/22/2023    Influenza - Quadrivalent 02/24/2020    Influenza - Quadrivalent - High Dose - PF (65 years and older) 11/07/2022    Influenza - Quadrivalent - PF *Preferred* (6 months and older) 10/20/2014, 10/27/2016, 10/31/2017, 11/12/2018, 11/22/2020, 03/04/2024    Influenza - Trivalent - Fluad - Adjuvanted - PF (65 years and older 10/21/2024    Influenza - Trivalent - Fluarix, Flulaval, Fluzone, Afluria - PF 10/27/2016, 11/12/2018    Influenza - Trivalent - Fluzone High Dose - PF (65 years and older) 03/28/2022    Pneumococcal Conjugate - 13 Valent 03/28/2022    Pneumococcal Conjugate - 20 Valent 09/05/2023    Tdap 02/24/2020        Patient Care Team:  Sil Joaquin DNP as PCP - General (Family Medicine)    Subjective:     Review of Systems   Constitutional: Negative.    HENT:  Positive for postnasal drip.    Respiratory:  Negative for chest tightness, shortness of breath and wheezing.    Cardiovascular:  Negative for chest pain, palpitations and leg swelling.   Gastrointestinal:  Positive for constipation. Negative for blood in stool.   Endocrine: Negative.    Genitourinary:  Negative for flank pain and hematuria.   Musculoskeletal:  Positive for back pain.   Skin: Negative.    Neurological: Negative.    Hematological: Negative.    Psychiatric/Behavioral:  "Negative.     All other systems reviewed and are negative.      12 point review of systems conducted, negative except as stated in the history of present illness. See HPI for details.    Objective:     Visit Vitals  /70 (BP Location: Left arm, Patient Position: Sitting)   Pulse 96   Temp 97.6 °F (36.4 °C)   Ht 5' 6" (1.676 m)   Wt 97.1 kg (214 lb)   SpO2 95%   BMI 34.54 kg/m²       Physical Exam  Vitals and nursing note reviewed.   Constitutional:       General: He is not in acute distress.     Appearance: He is not ill-appearing.   HENT:      Head: Normocephalic and atraumatic.      Nose: Nose normal.      Mouth/Throat:      Mouth: Mucous membranes are moist.      Pharynx: Oropharynx is clear.   Eyes:      General: No scleral icterus.     Extraocular Movements: Extraocular movements intact.      Conjunctiva/sclera: Conjunctivae normal.      Pupils: Pupils are equal, round, and reactive to light.   Neck:      Vascular: No carotid bruit.   Cardiovascular:      Rate and Rhythm: Normal rate and regular rhythm.      Pulses: Normal pulses.      Heart sounds: Normal heart sounds. No murmur heard.     No friction rub. No gallop.   Pulmonary:      Effort: Pulmonary effort is normal. No respiratory distress.      Breath sounds: Normal breath sounds. No wheezing, rhonchi or rales.   Abdominal:      General: Abdomen is flat. Bowel sounds are normal. There is no distension.      Palpations: Abdomen is soft. There is no mass.      Tenderness: There is no abdominal tenderness.   Genitourinary:     Prostate: Enlarged.      Rectum: External hemorrhoid present. Normal anal tone.   Musculoskeletal:         General: No tenderness or signs of injury. Normal range of motion.      Cervical back: Normal range of motion and neck supple.      Right lower leg: No edema.      Left lower leg: No edema.   Skin:     General: Skin is warm and dry.   Neurological:      General: No focal deficit present.      Mental Status: He is alert and " oriented to person, place, and time.   Psychiatric:         Mood and Affect: Mood normal.         Behavior: Behavior normal.         Labs Reviewed:  Chemistry:  Lab Results   Component Value Date     03/12/2025    K 4.2 03/12/2025    BUN 29 (H) 03/12/2025    CREATININE 0.98 03/12/2025    EGFRNORACEVR 84 03/12/2025    CALCIUM 9.9 03/12/2025    ALKPHOS 93 03/12/2025    ALBUMIN 4.5 03/12/2025    AST 41 03/12/2025    ALT 27 03/12/2025    [id number] 58.0 03/04/2024    TSH 2.980 03/04/2024    PSA 0.64 09/09/2024        Lab Results   Component Value Date    HGBA1C 5.8 03/12/2025        Hematology:  Lab Results   Component Value Date    WBC 7.97 09/09/2024    RBC 4.98 09/09/2024    HGB 14.6 09/09/2024    HCT 42.5 09/09/2024    MCV 85.3 09/09/2024    MCH 29.3 09/09/2024    MCHC 34.4 09/09/2024    RDW 12.3 09/09/2024     09/09/2024    MPV 11.1 09/09/2024       Lipid Panel:  Lab Results   Component Value Date    CHOL 134 03/12/2025    HDL 32 (L) 03/12/2025    TRIG 158 03/12/2025    TOTALCHOLEST 4.2 08/30/2021        Assessment:       ICD-10-CM ICD-9-CM   1. Aortic atherosclerosis  I70.0 440.0   2. Primary hypertension  I10 401.9   3. Hypertension, unspecified type  I10 401.9   4. Benign prostatic hyperplasia without urinary obstruction  N40.0 600.00   5. Obesity (BMI 35.0-39.9 without comorbidity)  E66.9 278.00   6. Lumbar pain with radiation down right leg  M54.50 724.2    M79.604    7. Medicare annual wellness visit, subsequent  Z00.00 V70.0   8. ACP (advance care planning)  Z71.89 V65.49        Plan:     1. Aortic atherosclerosis  Assessment & Plan:  Declined seeing cardiologist, continue pravastatin 40 mg qhs and low fat low chol diet. The current medical regimen is effective;  continue present plan and medications.        2. Primary hypertension  Assessment & Plan:  Benicar 40/25 mg one daily. Caution NSAID can increase blood pressure The current medical regimen is effective;  continue present plan and  medications.\       3. Hypertension, unspecified type    4. Benign prostatic hyperplasia without urinary obstruction  Assessment & Plan:  Enlarged, no nodules, external hemorrhoid, negative blood in stool. Check PSA in September 2025.      5. Obesity (BMI 35.0-39.9 without comorbidity)  Assessment & Plan:  Goal for healthy weight loss through exercise and activity increase vegetables and fruits in diet decrease fat concentrated carbs in diet goal for weight loss The current medical regimen is effective; continue present plan and medications.       6. Lumbar pain with radiation down right leg  Assessment & Plan:  Still with right posterior thigh radicular pain. Continues exercise bid and helping. The current medical regimen is effective;  continue present plan and medications.        7. Medicare annual wellness visit, subsequent  Assessment & Plan:  Health Maintenance   Topic Date Due    Shingles Vaccine (1 of 2) Never done    RSV Vaccine (Age 60+ and Pregnant patients) (1 - Risk 60-74 years 1-dose series) 03/15/2034 (Originally 10/4/2016)    Hemoglobin A1c (Prediabetes)  03/12/2026    Colorectal Cancer Screening  07/18/2027    TETANUS VACCINE  02/24/2030    Lipid Panel  03/12/2030    Hepatitis C Screening  Completed    Influenza Vaccine  Completed    Pneumococcal Vaccines (Age 50+)  Completed    Abdominal Aortic Aneurysm Screening  Completed    COVID-19 Vaccine  Discontinued            8. ACP (advance care planning)  Assessment & Plan:  Advance Care Planning    Date: 06/09/2025  Patient did not wish or was not able to name a surrogate decision maker or provide an Advance Care Plan.                The following assessments were completed and reviewed. See completed screening forms and assessments within the Encounter Summary.   [x] Health Risk Assessment   [x] CVD Risk Factors   [x] Obesity/Physical Activity -  Encouraged daily 30 minute physical activity x 5 days per week.   [x] Home Safety/Living Situation   [x]  Alcohol Screen  [x] Depression (PHQ) Screen   [x] Timed Get Up and Go   [x] Whisper Test  [x] Cognitive Function/Impairment Screen   [x] Nutrition Screening  [x] ADL Screen   [x] Opioid Screen:  [x] Patient does not have a prescription for opioids.   [] Patient has a prescription for opioids but is at low risk for abuse.   [x] Substance Abuse Screen:   [x] Patient does not use substances.   [] Patient screens positive for substance use disorder.   Advance Care Planning     Date: 06/09/2025  Patient did not wish or was not able to name a surrogate decision maker or provide an Advance Care Plan.counseling 8 minutes. Bring 5 wishes booklet completed if desires.        I attest to discussing Advance Care Planning with patient and/or family member.  Education was provided including the importance of the Health Care Power of , Advance Directives, and/or [place] documentation.  The patient expressed understanding to the importance of this information and discussion.    Provided patient with a 5-10 year written screening schedule and personal prevention plan. Recommendations were developed using the USPSTF age appropriate recommendations. Education, counseling, and referrals were provided as needed. After Visit Summary printed and given to patient, which includes a list of additional screenings\tests needed.    Follow up in about 3 months (around 9/9/2025). In addition to their scheduled follow up, the patient has also been instructed to follow up on as needed basis.     Future Appointments   Date Time Provider Department Center   9/15/2025  7:35 AM NURSE, [facility] FAMILY MEDICINE [facility] Shiva Joaquin DNP

## 2025-06-09 NOTE — ASSESSMENT & PLAN NOTE
Advance Care Planning     Date: 06/09/2025  Patient did not wish or was not able to name a surrogate decision maker or provide an Advance Care Plan.

## 2025-06-09 NOTE — ASSESSMENT & PLAN NOTE
Health Maintenance   Topic Date Due    Shingles Vaccine (1 of 2) Never done    RSV Vaccine (Age 60+ and Pregnant patients) (1 - Risk 60-74 years 1-dose series) 03/15/2034 (Originally 10/4/2016)    Hemoglobin A1c (Prediabetes)  03/12/2026    Colorectal Cancer Screening  07/18/2027    TETANUS VACCINE  02/24/2030    Lipid Panel  03/12/2030    Hepatitis C Screening  Completed    Influenza Vaccine  Completed    Pneumococcal Vaccines (Age 50+)  Completed    Abdominal Aortic Aneurysm Screening  Completed    COVID-19 Vaccine  Discontinued

## 2025-06-09 NOTE — ASSESSMENT & PLAN NOTE
Still with right posterior thigh radicular pain. Continues exercise bid and helping. The current medical regimen is effective;  continue present plan and medications.

## 2025-07-09 DIAGNOSIS — E78.5 HYPERLIPIDEMIA, UNSPECIFIED HYPERLIPIDEMIA TYPE: ICD-10-CM

## 2025-07-09 RX ORDER — PRAVASTATIN SODIUM 40 MG/1
40 TABLET ORAL NIGHTLY
Qty: 90 TABLET | Refills: 1 | Status: SHIPPED | OUTPATIENT
Start: 2025-07-09

## 2025-07-29 ENCOUNTER — OFFICE VISIT (OUTPATIENT)
Dept: FAMILY MEDICINE | Facility: CLINIC | Age: 70
End: 2025-07-29
Payer: MEDICARE

## 2025-07-29 VITALS
HEART RATE: 78 BPM | OXYGEN SATURATION: 97 % | HEIGHT: 66 IN | TEMPERATURE: 98 F | WEIGHT: 216 LBS | BODY MASS INDEX: 34.72 KG/M2 | RESPIRATION RATE: 20 BRPM | DIASTOLIC BLOOD PRESSURE: 74 MMHG | SYSTOLIC BLOOD PRESSURE: 132 MMHG

## 2025-07-29 DIAGNOSIS — J31.0 CHRONIC RHINITIS: ICD-10-CM

## 2025-07-29 DIAGNOSIS — H61.22 HEARING LOSS OF LEFT EAR DUE TO CERUMEN IMPACTION: Primary | ICD-10-CM

## 2025-07-29 DIAGNOSIS — I10 PRIMARY HYPERTENSION: ICD-10-CM

## 2025-07-29 DIAGNOSIS — E66.9 OBESITY (BMI 35.0-39.9 WITHOUT COMORBIDITY): ICD-10-CM

## 2025-07-29 DIAGNOSIS — H60.312 ACUTE DIFFUSE OTITIS EXTERNA OF LEFT EAR: ICD-10-CM

## 2025-07-29 PROBLEM — H92.09 EAR PAIN: Status: ACTIVE | Noted: 2025-07-29

## 2025-07-29 PROCEDURE — 1160F RVW MEDS BY RX/DR IN RCRD: CPT | Mod: ,,, | Performed by: NURSE PRACTITIONER

## 2025-07-29 PROCEDURE — 99214 OFFICE O/P EST MOD 30 MIN: CPT | Mod: 25,,, | Performed by: NURSE PRACTITIONER

## 2025-07-29 PROCEDURE — 3075F SYST BP GE 130 - 139MM HG: CPT | Mod: ,,, | Performed by: NURSE PRACTITIONER

## 2025-07-29 PROCEDURE — 3008F BODY MASS INDEX DOCD: CPT | Mod: ,,, | Performed by: NURSE PRACTITIONER

## 2025-07-29 PROCEDURE — 1159F MED LIST DOCD IN RCRD: CPT | Mod: ,,, | Performed by: NURSE PRACTITIONER

## 2025-07-29 PROCEDURE — 69210 REMOVE IMPACTED EAR WAX UNI: CPT | Mod: ,,, | Performed by: NURSE PRACTITIONER

## 2025-07-29 PROCEDURE — 3044F HG A1C LEVEL LT 7.0%: CPT | Mod: ,,, | Performed by: NURSE PRACTITIONER

## 2025-07-29 PROCEDURE — 3078F DIAST BP <80 MM HG: CPT | Mod: ,,, | Performed by: NURSE PRACTITIONER

## 2025-07-29 RX ORDER — CIPROFLOXACIN AND DEXAMETHASONE 3; 1 MG/ML; MG/ML
4 SUSPENSION/ DROPS AURICULAR (OTIC) 2 TIMES DAILY
Qty: 7.5 ML | Refills: 0 | Status: SHIPPED | OUTPATIENT
Start: 2025-07-29 | End: 2025-08-05

## 2025-07-29 NOTE — PROGRESS NOTES
Patient ID: Durga Plata  : 1955     Chief Complaint: Otalgia (Patient presents for ear pain)    Allergies: Patient has no known allergies.     History of Present Illness:  The patient is a 69 y.o. White male who presents to clinic for evaluation and management with a chief complaint of Otalgia (Patient presents for ear pain)   Otalgia   There is pain in the left ear. This is a new problem. The current episode started in the past 7 days. The problem occurs constantly. The problem has been gradually improving. The maximum temperature recorded prior to his arrival was 100.4 - 100.9 F. The pain is moderate. Associated symptoms include hearing loss, rhinorrhea and a sore throat (last week but better now). Pertinent negatives include no abdominal pain, coughing, diarrhea, ear discharge, headaches, neck pain or rash. The treatment provided moderate relief. There is no history of a chronic ear infection, hearing loss or a tympanostomy tube.        Past Medical History:  has a past medical history of Benign prostatic hyperplasia without urinary obstruction, History of nutritional deficiency, and Impaired fasting blood sugar.    Social History:  reports that he has quit smoking. His smoking use included cigarettes. He has been exposed to tobacco smoke. He has never used smokeless tobacco. He reports that he does not drink alcohol and does not use drugs.    Care Team: Patient Care Team:  Sil Joaquin DNP as PCP - General (Family Medicine)     Current Medications:  Current Outpatient Medications   Medication Instructions    ascorbic acid (vitamin C) (VITAMIN C) 500 mg, Daily    co-enzyme Q-10 100 mg, Daily    multivitamin (THERAGRAN) per tablet 1 tablet, Daily    olmesartan-hydrochlorothiazide (BENICAR HCT) 40-25 mg per tablet 1 tablet, Oral, Daily    potassium citrate 99 mg Cap 1 capsule, Daily    pravastatin (PRAVACHOL) 40 mg, Oral, Nightly    vitamin D (VITAMIN D3) 1,000 Units, Daily       Review of Systems  "  HENT:  Positive for ear pain, hearing loss, rhinorrhea and sore throat (last week but better now). Negative for ear discharge.    Respiratory:  Negative for cough.    Gastrointestinal:  Negative for abdominal pain and diarrhea.   Musculoskeletal:  Negative for neck pain.   Integumentary:  Negative for rash.   Neurological:  Negative for headaches.   All other systems reviewed and are negative.       Visit Vitals  /74 (BP Location: Right arm, Patient Position: Sitting)   Pulse 78   Temp 97.6 °F (36.4 °C)   Resp 20   Ht 5' 6" (1.676 m)   Wt 98 kg (216 lb)   SpO2 97%   BMI 34.86 kg/m²       Physical Exam  Vitals and nursing note reviewed.   Constitutional:       General: He is not in acute distress.     Appearance: Normal appearance. He is obese. He is not ill-appearing.   HENT:      Head: Normocephalic and atraumatic.      Jaw: No tenderness, swelling, pain on movement or malocclusion.      Left Ear: Decreased hearing noted. Tenderness present. There is impacted cerumen. No mastoid tenderness.      Nose: Rhinorrhea present.      Mouth/Throat:      Mouth: Mucous membranes are moist.      Pharynx: Oropharynx is clear. Postnasal drip present.      Tonsils: No tonsillar exudate or tonsillar abscesses.   Eyes:      General: No scleral icterus.     Extraocular Movements: Extraocular movements intact.      Conjunctiva/sclera: Conjunctivae normal.      Pupils: Pupils are equal, round, and reactive to light.   Neck:      Vascular: No carotid bruit.   Cardiovascular:      Rate and Rhythm: Normal rate and regular rhythm.      Heart sounds: No murmur heard.     No friction rub. No gallop.   Pulmonary:      Effort: Pulmonary effort is normal. No respiratory distress.      Breath sounds: Normal breath sounds. No wheezing, rhonchi or rales.   Abdominal:      General: Abdomen is flat. Bowel sounds are normal. There is no distension.      Palpations: Abdomen is soft. There is no mass.      Tenderness: There is no abdominal " tenderness.   Musculoskeletal:         General: Normal range of motion.      Cervical back: Normal range of motion and neck supple.   Skin:     General: Skin is warm and dry.   Neurological:      General: No focal deficit present.      Mental Status: He is alert and oriented to person, place, and time.   Psychiatric:         Mood and Affect: Mood normal.          Labs Reviewed:  Chemistry:  Lab Results   Component Value Date     03/12/2025    K 4.2 03/12/2025    BUN 29 (H) 03/12/2025    CREATININE 0.98 03/12/2025    EGFRNORACEVR 84 03/12/2025    CALCIUM 9.9 03/12/2025    ALKPHOS 93 03/12/2025    ALBUMIN 4.5 03/12/2025    AST 41 03/12/2025    ALT 27 03/12/2025    [id number] 58.0 03/04/2024    TSH 2.980 03/04/2024    PSA 0.64 09/09/2024        Lab Results   Component Value Date    HGBA1C 5.8 03/12/2025        Hematology:  Lab Results   Component Value Date    WBC 7.97 09/09/2024    RBC 4.98 09/09/2024    HGB 14.6 09/09/2024    HCT 42.5 09/09/2024    MCV 85.3 09/09/2024    MCH 29.3 09/09/2024    MCHC 34.4 09/09/2024    RDW 12.3 09/09/2024     09/09/2024    MPV 11.1 09/09/2024       Lipid Panel:  Lab Results   Component Value Date    CHOL 134 03/12/2025    HDL 32 (L) 03/12/2025    LDLCALC 78.9 03/07/2022    LDLDIRECT 78.0 03/12/2025    TRIG 158 03/12/2025    TOTALCHOLEST 4.2 08/30/2021        Assessment & Plan:  1. Otalgia, unspecified laterality    2. Primary hypertension  Assessment & Plan:  Benicar 40/25 mg one daily. Caution decongestant can increase blood pressure Monitor the blood pressure during illness and treatment. Notify if elevation >130/80.       3. Obesity (BMI 35.0-39.9 without comorbidity)  Assessment & Plan:  Goal for healthy weight loss through exercise and activity increase vegetables and fruits in diet decrease fat concentrated carbs in diet goal for weight loss The current medical regimen is effective; continue present plan and medications.            Future Appointments   Date Time  Provider Department Center   9/15/2025  7:35 AM NURSE, [facility] FAMILY MEDICINE [facility] MICHELE Vaughn   6/15/2026  7:00 AM Sil Joaquin DNP [facility] [place]       No follow-ups on file. Call sooner if needed.    Sil Joaquin DNP    Lab Frequency Next Occurrence   Ambulatory referral/consult to Physical/Occupational Therapy Once 10/28/2024

## 2025-07-29 NOTE — ASSESSMENT & PLAN NOTE
Benicar 40/25 mg one daily. Caution decongestant can increase blood pressure Monitor the blood pressure during illness and treatment. Notify if elevation >130/80.

## 2025-07-29 NOTE — ASSESSMENT & PLAN NOTE
Left cerumen impaction with erythema and point tenderness to external canal. Ciprodex bid for 7 days.

## 2025-07-29 NOTE — PROCEDURES
"Ear Cerumen Removal    Date/Time: 7/29/2025 8:00 AM    Performed by: Sil Joaquin DNP  Authorized by: Sil Joaquin DNP    Time out: Immediately prior to procedure a "time out" was called to verify the correct patient, procedure, equipment, support staff and site/side marked as required.      Local anesthetic:  None  Ceruminolytics applied: Ceruminolytics applied prior to the procedure    Medication Used:  Debrox  Location details:  Left ear  Procedure type: curette and irrigation    Cerumen  Removal Results:  Cerumen completely removed  Patient tolerance:  Patient tolerated the procedure well with no immediate complications    "